# Patient Record
Sex: FEMALE | Race: WHITE | NOT HISPANIC OR LATINO | Employment: UNEMPLOYED | URBAN - METROPOLITAN AREA
[De-identification: names, ages, dates, MRNs, and addresses within clinical notes are randomized per-mention and may not be internally consistent; named-entity substitution may affect disease eponyms.]

---

## 2017-01-11 ENCOUNTER — TELEPHONE (OUTPATIENT)
Dept: GASTROENTEROLOGY | Facility: CLINIC | Age: 52
End: 2017-01-11
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
## 2017-06-02 ENCOUNTER — OFFICE VISIT (OUTPATIENT)
Dept: OBSTETRICS AND GYNECOLOGY | Facility: CLINIC | Age: 52
End: 2017-06-02
Payer: MEDICAID

## 2017-06-02 VITALS — SYSTOLIC BLOOD PRESSURE: 124 MMHG | DIASTOLIC BLOOD PRESSURE: 64 MMHG

## 2017-06-02 DIAGNOSIS — Z00.00 PREVENTATIVE HEALTH CARE: ICD-10-CM

## 2017-06-02 DIAGNOSIS — Z12.39 BREAST CANCER SCREENING: Primary | ICD-10-CM

## 2017-06-02 DIAGNOSIS — Z00.00 WELL WOMAN EXAM WITHOUT GYNECOLOGICAL EXAM: ICD-10-CM

## 2017-06-02 DIAGNOSIS — Z00.00 ANNUAL PHYSICAL EXAM: Primary | ICD-10-CM

## 2017-06-02 PROCEDURE — 88175 CYTOPATH C/V AUTO FLUID REDO: CPT

## 2017-06-02 PROCEDURE — 99213 OFFICE O/P EST LOW 20 MIN: CPT | Mod: PBBFAC | Performed by: NURSE PRACTITIONER

## 2017-06-02 PROCEDURE — 99999 PR PBB SHADOW E&M-EST. PATIENT-LVL III: CPT | Mod: PBBFAC,,, | Performed by: NURSE PRACTITIONER

## 2017-06-02 PROCEDURE — 99386 PREV VISIT NEW AGE 40-64: CPT | Mod: S$PBB,,, | Performed by: NURSE PRACTITIONER

## 2017-06-02 RX ORDER — PAROXETINE HYDROCHLORIDE 20 MG/1
20 TABLET, FILM COATED ORAL
COMMUNITY
Start: 2017-05-17 | End: 2017-06-02 | Stop reason: SDUPTHER

## 2017-06-02 NOTE — PROGRESS NOTES
CC: Well woman exam    Leticia Jimenez is a 51 y.o. female presents for well woman exam. Patient is s/p hysterectomy for endometriosis no ovary conservation. Last mammogram 3 years, normal. Patient wants to discuss HRT for night sweats.    Past Medical History:   Diagnosis Date    ADHD (attention deficit hyperactivity disorder)     Anxiety     Depression     Hypertension     Liver disease      Past Surgical History:   Procedure Laterality Date    HYSTERECTOMY      OOPHORECTOMY      TONSILLECTOMY       Social History     Social History    Marital status:      Spouse name: N/A    Number of children: N/A    Years of education: N/A     Occupational History    Not on file.     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     Family History   Problem Relation Age of Onset    Diabetes Maternal Grandmother     Heart disease Maternal Grandfather     Cancer Neg Hx     Melanoma Neg Hx     Psoriasis Neg Hx     Lupus Neg Hx     Eczema Neg Hx      OB History     No data available          There were no vitals taken for this visit.      ROS:  GENERAL: Denies weight gain or weight loss. Feeling well overall.   SKIN: Denies rash or lesions.   HEAD: Denies head injury or headache.   NODES: Denies enlarged lymph nodes.   CHEST: Denies chest pain or shortness of breath.   CARDIOVASCULAR: Denies palpitations or left sided chest pain.   ABDOMEN: No abdominal pain, constipation, diarrhea, nausea, vomiting or rectal bleeding.   URINARY: No frequency, dysuria, hematuria, or burning on urination.  REPRODUCTIVE: See HPI.   BREASTS: The patient performs breast  self-examination and denies pain, lumps, or nipple discharge.   HEMATOLOGIC: No easy bruisability or excessive bleeding.   MUSCULOSKELETAL: Denies joint pain or swelling.   NEUROLOGIC: Denies syncope or weakness.   PSYCHIATRIC: Denies depression, anxiety or mood swings.    PHYSICAL EXAM:  APPEARANCE: Well nourished, well developed, in no acute distress.  AFFECT: WNL, alert and oriented x 3  SKIN: No acne or hirsutism  NECK: Neck symmetric without masses or thyromegaly  NODES: No inguinal, cervical, axillary, or femoral lymph node enlargement  CHEST: Good respiratory effect  ABDOMEN: Soft.  No tenderness or masses.    BREASTS: Symmetrical, no skin changes or visible lesions.  No palpable masses, nipple discharge bilaterally.  PELVIC: Normal external genitalia without lesions.  Normal hair distribution.  Adequate perineal body, normal urethral meatus.  Vagina atrophy.  No significant cystocele or rectocele.      1. Breast cancer screening  Mammo Digital Screening Bilat with CAD   2. Well woman exam without gynecological exam  Mammo Digital Screening Bilat with CAD    Liquid-based pap smear, screening   3. Preventative health care  Mammo Digital Screening Bilat with CAD    Liquid-based pap smear, screening    PLAN:  Mammogram  Pap exam  Patient aware of the need to see MD for HRT due to past medical history.  Patient was counseled today on A.C.S. Pap guidelines and recommendations for yearly pelvic exams, mammograms and monthly self breast exams; to see her PCP for other health maintenance.

## 2017-06-13 ENCOUNTER — HOSPITAL ENCOUNTER (OUTPATIENT)
Dept: RADIOLOGY | Facility: HOSPITAL | Age: 52
Discharge: HOME | End: 2017-06-13
Attending: NURSE PRACTITIONER
Payer: MEDICAID

## 2017-06-13 VITALS — WEIGHT: 103 LBS | HEIGHT: 64 IN | BODY MASS INDEX: 17.58 KG/M2

## 2017-06-13 DIAGNOSIS — Z00.00 ANNUAL PHYSICAL EXAM: ICD-10-CM

## 2017-06-13 PROCEDURE — 77067 SCR MAMMO BI INCL CAD: CPT | Mod: TC

## 2017-06-13 PROCEDURE — 77067 SCR MAMMO BI INCL CAD: CPT | Mod: 26,,, | Performed by: RADIOLOGY

## 2017-08-28 ENCOUNTER — HOSPITAL ENCOUNTER (EMERGENCY)
Facility: HOSPITAL | Age: 52
Discharge: HOME | End: 2017-08-28
Payer: MEDICAID

## 2017-08-28 VITALS
OXYGEN SATURATION: 99 % | RESPIRATION RATE: 18 BRPM | WEIGHT: 128 LBS | SYSTOLIC BLOOD PRESSURE: 129 MMHG | HEART RATE: 88 BPM | BODY MASS INDEX: 22.68 KG/M2 | DIASTOLIC BLOOD PRESSURE: 72 MMHG | HEIGHT: 63 IN | TEMPERATURE: 99 F

## 2017-08-28 DIAGNOSIS — M25.561 PAIN AND SWELLING OF RIGHT KNEE: ICD-10-CM
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
## 2017-08-28 DIAGNOSIS — S49.92XA INJURY OF LEFT SHOULDER: ICD-10-CM

## 2017-08-28 DIAGNOSIS — S82.001A CLOSED NONDISPLACED FRACTURE OF RIGHT PATELLA, UNSPECIFIED FRACTURE MORPHOLOGY, INITIAL ENCOUNTER: Primary | ICD-10-CM

## 2017-08-28 DIAGNOSIS — M25.461 EFFUSION OF RIGHT KNEE JOINT: ICD-10-CM

## 2017-08-28 DIAGNOSIS — S40.012A CONTUSION OF LEFT SHOULDER, INITIAL ENCOUNTER: ICD-10-CM

## 2017-08-28 DIAGNOSIS — M25.461 PAIN AND SWELLING OF RIGHT KNEE: ICD-10-CM

## 2017-08-28 PROCEDURE — 99283 EMERGENCY DEPT VISIT LOW MDM: CPT | Mod: 25

## 2017-08-28 PROCEDURE — 29505 APPLICATION LONG LEG SPLINT: CPT | Mod: LT

## 2017-08-28 RX ORDER — HYDROCODONE BITARTRATE AND ACETAMINOPHEN 5; 325 MG/1; MG/1
1 TABLET ORAL EVERY 6 HOURS PRN
Qty: 7 TABLET | Refills: 0 | Status: SHIPPED | OUTPATIENT
Start: 2017-08-28 | End: 2017-09-08

## 2017-08-28 NOTE — ED PROVIDER NOTES
SCRIBE #1 NOTE: I, Smith Gleason, am scribing for, and in the presence of, HARIKA Mcdermott. I have scribed the entire note.      History      Chief Complaint   Patient presents with    Knee Pain     c/o Rt knee pain and Lt shoulder pain after fall       Review of patient's allergies indicates:   Allergen Reactions    Sulfa (sulfonamide antibiotics)         HPI   HPI    8/28/2017, 5:24 PM   History obtained from the patient      History of Present Illness: Leticia Jimenez is a 51 y.o. female patient who presents to the Emergency Department for right knee pain which onset suddenly 2 hours PTA after slipping and falling. Symptoms are constant and moderate in severity.  No mitigating or exacerbating factors reported. Associated sxs include left shoulder pain. Patient denies any LOC, fever, chills, n/v/d, numbness, weakness, back pain, neck pain, abdominal pain, CP, SOB, HA, and all other sxs at this time. No further complaints or concerns at this time.         Arrival mode: Personal vehicle      PCP: Petey Hernandez Iii, MD       Past Medical History:  Past Medical History:   Diagnosis Date    ADHD (attention deficit hyperactivity disorder)     Anxiety     Depression     GERD (gastroesophageal reflux disease)     Hypertension     Liver disease        Past Surgical History:  Past Surgical History:   Procedure Laterality Date    breast implants Bilateral 15 yrs ago    HYSTERECTOMY      OOPHORECTOMY      TONSILLECTOMY           Family History:  Family History   Problem Relation Age of Onset    Diabetes Maternal Grandmother     Heart disease Maternal Grandfather     Cancer Neg Hx     Melanoma Neg Hx     Psoriasis Neg Hx     Lupus Neg Hx     Eczema Neg Hx        Social History:  Social History     Social History Main Topics    Smoking status: Current Some Day Smoker     Packs/day: 0.25     Types: Cigarettes    Smokeless tobacco: Never Used    Alcohol use 0.0 oz/week      Comment: drinking today     Drug use: No    Sexual activity: Not Currently       ROS   Review of Systems   Constitutional: Negative for chills and fever.        - LOC   HENT: Negative for sore throat.    Respiratory: Negative for shortness of breath.    Cardiovascular: Negative for chest pain.   Gastrointestinal: Negative for abdominal pain, diarrhea, nausea and vomiting.   Genitourinary: Negative for dysuria.   Musculoskeletal: Positive for arthralgias (right knee, left shoulder). Negative for back pain and neck pain.   Skin: Negative for rash.   Neurological: Negative for dizziness, weakness, light-headedness, numbness and headaches.   Hematological: Does not bruise/bleed easily.       Physical Exam      Initial Vitals [08/28/17 1718]   BP Pulse Resp Temp SpO2   129/72 88 18 99 °F (37.2 °C) 99 %      MAP       91          Physical Exam  Nursing Notes and Vital Signs Reviewed.  Constitutional: Patient is in no acute distress. Well-developed and well-nourished. Smells of ETOH.  Head: Atraumatic. Normocephalic.  Eyes: PERRL. EOM intact. Conjunctivae are not pale. No scleral icterus.  Ears: Right TM normal. Left TM normal. No erythema. No bulging. No effusion or air-fluid levels. No perforation. Small abrasion with clotted blood to the left external ear. No hemotympanum. No TM rupture.  ENT: Moist mucus membranes.   Neck: Supple. Full ROM. No lymphadenopathy.  Cardiovascular: Regular rate. Regular rhythm. No murmurs, rubs, or gallops. Distal pulses are 2+ and symmetric.  Pulmonary/Chest: No respiratory distress. Clear to auscultation bilaterally. No wheezing, rales, or rhonchi.    Musculoskeletal: Moves all extremities. No obvious deformities. No edema.   LUE: has no evident deformity. negative for swelling. Negative for tenderness to palpation. Mild decreased ROM secondary to pain. Cap refill distally is <2 seconds. Radial pulses are equal and 2+ bilaterally. No motor deficit. No distal sensory deficit.  Right Knee:  No obvious deformity.  "There is moderate amount of  Swelling to the anterior knee.  There is diffuse tenderness to the anterior knee. Decreased ROM of the right knee.  No increased warmth, erythema, induration or fluctuance.  DP and PT pulses are 2+.  Normal capillary refill.  Distal sensation is intact.  Skin: Warm and dry.  Neurological:  Alert, awake, and appropriate.  Normal speech.  No acute focal neurological deficits are appreciated.  Psychiatric: Normal affect. Good eye contact. Appropriate in content.    ED Course    Orthopedic Injury  Date/Time: 8/28/2017 6:05 PM  Authorized by: DORY CARTER   Performed by: ALISSON HOLDEN  Injury location: knee  Location details: left knee  Injury type: fracture  Fracture type: patellar  Pre-procedure distal perfusion: normal  Pre-procedure neurological function: normal  Pre-procedure neurovascular assessment: neurovascularly intact  Pre-procedure range of motion: reduced  Immobilization: splint and crutches  Splint type: knee immobilizer.  Supplies used: Ortho-Glass  Post-procedure neurovascular assessment: post-procedure neurovascularly intact  Post-procedure distal perfusion: normal  Post-procedure neurological function: normal  Patient tolerance: Patient tolerated the procedure well with no immediate complications        ED Vital Signs:  Vitals:    08/28/17 1718   BP: 129/72   Pulse: 88   Resp: 18   Temp: 99 °F (37.2 °C)   TempSrc: Oral   SpO2: 99%   Weight: 58.1 kg (128 lb)   Height: 5' 3" (1.6 m)           Imaging Results:  Imaging Results          X-Ray Shoulder Trauma Left (Final result)  Result time 08/28/17 18:07:03    Final result by Tyson Daniels MD (08/28/17 18:07:03)                 Impression:         Negative.        Electronically signed by: TYSON DANIELS MD  Date:     08/28/17  Time:    18:07              Narrative:    Exam: XR SHOULDER TRAUMA 3 VIEW LEFT    Clinical History:    Acute left shoulder pain. Initial encounter.    Findings:      No osseous, articular, or " soft tissue abnormality demonstrated.                             X-Ray Knee 3 View Right (Final result)  Result time 08/28/17 18:06:37    Final result by Tyson Daniels MD (08/28/17 18:06:37)                 Impression:         Right patellar fracture.        Electronically signed by: TYSON DANIELS MD  Date:     08/28/17  Time:    18:06              Narrative:    Exam: XR KNEE 3 VIEW RIGHT    Clinical History:    Acute right knee pain. Initial encounter.    Findings:      Acute transverse, nondisplaced fracture through the inferior third of the patella.  Associated large joint effusion.  The joint spaces are preserved.  Alignment is satisfactory.                                      The Emergency Provider reviewed the vital signs and test results, which are outlined above.    ED Discussion     6:10 PM: Reassessed pt at this time.  Pt is awake, alert, and in no distress. Discussed with pt all pertinent ED information and results. Discussed pt dx and plan of tx. Gave pt all f/u and return to the ED instructions. All questions and concerns were addressed at this time. Pt expresses understanding of information and instructions, and is comfortable with plan to discharge. Pt is stable for discharge.        ED Medication(s):  Medications - No data to display    New Prescriptions    HYDROCODONE-ACETAMINOPHEN 5-325MG (NORCO) 5-325 MG PER TABLET    Take 1 tablet by mouth every 6 (six) hours as needed for Pain.       Follow-up Information     Romaine Green MD. Call in 2 days.    Specialty:  Orthopedic Surgery  Contact information:  [address] [place] [address] DR Lydia COLON [address] [phone]                     Medical Decision Making    Medical Decision Making:   Clinical Tests:   Radiological Study: Ordered and Reviewed           Scribe Attestation:   Scribe #1: I performed the above scribed service and the documentation accurately describes the services I performed. I attest to the accuracy of the note.    Attending:    Physician Attestation Statement for Scribe #1: I, HARIKA Mcdermott, personally performed the services described in this documentation, as scribed by Smith Gleason, in my presence, and it is both accurate and complete.          Clinical Impression       ICD-10-CM ICD-9-CM   1. Closed nondisplaced fracture of right patella, unspecified fracture morphology, initial encounter S82.001A 822.0   2. Fall W19.XXXA E888.9   3. Injury of left shoulder S49.92XA 959.2   4. Pain and swelling of right knee M25.561 719.46    M25.461 719.06   5. Effusion of right knee joint M25.461 719.06   6. Contusion of left shoulder, initial encounter S40.012A 923.00       Disposition:   Disposition: Discharged  Condition: Stable           Eliel Hills PA-C  08/28/17 1926

## 2017-08-31 ENCOUNTER — HOSPITAL ENCOUNTER (OUTPATIENT)
Dept: RADIOLOGY | Facility: HOSPITAL | Age: 52
Discharge: HOME | End: 2017-08-31
Attending: PHYSICIAN ASSISTANT
Payer: MEDICAID

## 2017-08-31 ENCOUNTER — OFFICE VISIT (OUTPATIENT)
Dept: ORTHOPEDICS | Facility: CLINIC | Age: 52
End: 2017-08-31
Payer: MEDICAID

## 2017-08-31 VITALS
SYSTOLIC BLOOD PRESSURE: 146 MMHG | BODY MASS INDEX: 22.68 KG/M2 | WEIGHT: 128 LBS | HEART RATE: 107 BPM | DIASTOLIC BLOOD PRESSURE: 100 MMHG | RESPIRATION RATE: 20 BRPM | HEIGHT: 63 IN

## 2017-08-31 DIAGNOSIS — G89.11 ACUTE PAIN OF LEFT SHOULDER DUE TO TRAUMA: ICD-10-CM

## 2017-08-31 DIAGNOSIS — S82.091A OTHER CLOSED FRACTURE OF RIGHT PATELLA, INITIAL ENCOUNTER: Primary | ICD-10-CM

## 2017-08-31 DIAGNOSIS — M25.512 ACUTE PAIN OF LEFT SHOULDER DUE TO TRAUMA: ICD-10-CM

## 2017-08-31 DIAGNOSIS — S82.091A OTHER CLOSED FRACTURE OF RIGHT PATELLA, INITIAL ENCOUNTER: ICD-10-CM

## 2017-08-31 DIAGNOSIS — S42.292A HUMERAL HEAD FRACTURE, LEFT, CLOSED, INITIAL ENCOUNTER: ICD-10-CM

## 2017-08-31 PROCEDURE — 99999 PR PBB SHADOW E&M-EST. PATIENT-LVL IV: CPT | Mod: PBBFAC,,, | Performed by: PHYSICIAN ASSISTANT

## 2017-08-31 PROCEDURE — 99204 OFFICE O/P NEW MOD 45 MIN: CPT | Mod: S$PBB,,, | Performed by: PHYSICIAN ASSISTANT

## 2017-08-31 PROCEDURE — 73560 X-RAY EXAM OF KNEE 1 OR 2: CPT | Mod: TC,PO,LT

## 2017-08-31 PROCEDURE — 3080F DIAST BP >= 90 MM HG: CPT | Mod: ,,, | Performed by: PHYSICIAN ASSISTANT

## 2017-08-31 PROCEDURE — 99214 OFFICE O/P EST MOD 30 MIN: CPT | Mod: PBBFAC,25,PO | Performed by: PHYSICIAN ASSISTANT

## 2017-08-31 PROCEDURE — 3008F BODY MASS INDEX DOCD: CPT | Mod: ,,, | Performed by: PHYSICIAN ASSISTANT

## 2017-08-31 PROCEDURE — 73030 X-RAY EXAM OF SHOULDER: CPT | Mod: 26,LT,, | Performed by: RADIOLOGY

## 2017-08-31 PROCEDURE — 73030 X-RAY EXAM OF SHOULDER: CPT | Mod: TC,PO,LT

## 2017-08-31 PROCEDURE — 73560 X-RAY EXAM OF KNEE 1 OR 2: CPT | Mod: 26,59,LT, | Performed by: RADIOLOGY

## 2017-08-31 PROCEDURE — 3077F SYST BP >= 140 MM HG: CPT | Mod: ,,, | Performed by: PHYSICIAN ASSISTANT

## 2017-08-31 PROCEDURE — 73562 X-RAY EXAM OF KNEE 3: CPT | Mod: 26,RT,, | Performed by: RADIOLOGY

## 2017-08-31 RX ORDER — TRAMADOL HYDROCHLORIDE 50 MG/1
50 TABLET ORAL EVERY 8 HOURS PRN
Qty: 40 TABLET | Refills: 0 | Status: SHIPPED | OUTPATIENT
Start: 2017-08-31 | End: 2017-09-08

## 2017-08-31 NOTE — PATIENT INSTRUCTIONS
Shoulder Fracture  You have a break (fracture) of the shoulder. This may be a small crack in the bone. Or it may be a major break with the broken parts pushed out of position.    If you have only a crack in the bone and no bone fragments are out of place, you will probably be treated with a shoulder immobilizer. This is a special type of sling. Casts are usually not used for this type of fracture. Your bone should heal in 4 to 8 weeks. More serious injuries may need surgery to put the bones back into the correct position for healing.  Home care  Follow these tips to care for yourself at home:  · Leave the shoulder immobilizer in place. This will support the injured arm at your side. This is the best position for the bone to heal.  · The shoulder immobilizer is adjustable. If it becomes loose, adjust it so that your forearm is level with the ground (horizontal). Your hand should be level with your elbow.  · Apply an ice pack to the injured area for 20 minutes every 1 to 2 hours the first day. You can make an ice pack by putting ice cubes in a plastic bag. A bag of frozen peas or something similar works well too. Wrap the bag in a towel before putting it on your shoulder. Continue with ice packs 3 to 4 times a day for the next 2 to 3 days. Then use the ice as needed to relieve pain and swelling.  · You may take acetaminophen or ibuprofen to relieve pain, unless another pain medicine was prescribed. If you have chronic liver or kidney disease or ever had a stomach ulcer or gastrointestinal bleeding, talk with your doctor before using these medicines.  · Dont take the sling off before your next exam unless you were told to do so. Ask if you should move your elbow, wrist, and hand.  Follow-up care  Follow up with your healthcare provider, or as advised.  A shoulder joint will become stiff if left in a sling for too long. Ask your doctor when it is safe to begin range-of-motion exercises.  When to seek medical  advice  Call your healthcare provider right away if any of these occur:  · Your fingers become swollen, cold, blue, numb, or tingly  · Your shoulder or upper arm swells a lot or looks very bruised  · The pain in your shoulder gets worse  · The splint or immobilizer breaks  · You have a fever or chills  Date Last Reviewed: 8/1/2016  © 4396-1354 [employer]. [address] [place] [address], [place] [address]. All rights reserved. This information is not intended as a substitute for professional medical care. Always follow your healthcare professional's instructions.        Patella Fracture  Your kneecap bone (patella) is broken (fractured). This causes pain, swelling, and sometimes bruising. Depending on how severe the fracture is, it may take about 4 to 6 weeks to heal. A knee brace (immobilizer), splint, or cast will be put on.    Home care  Follow these guidelines when caring for yourself at home:  · You will be given a splint, cast, or knee brace to keep your knee joint from moving. Unless you were told otherwise, use crutches or a walker. Dont put weight on the injured leg until your health care provider tells you to do so. You can rent crutches or a walker at many pharmacies and surgical or orthopedic supply stores.  · Keep your leg elevated to reduce pain and swelling. When sleeping, put a pillow under the injured leg. When sitting, support the injured leg so it is level with your waist. This is very important during the first 2 days (48 hours).  · Put an ice pack on the injured area. Do this for 20 minutes every 1 to 2 hours the first day for pain relief. You can make an ice pack by wrapping a plastic bag of ice cubes in a thin towel. As the ice melts, be careful that the cast/splint/brace doesnt get wet. You can place the ice pack directly over the splint/cast/brace. Continue using the ice pack 3 to 4 times a day for the next 2 days. Then use the ice pack as needed to ease pain and swelling.  · Keep  the cast/splint/brace completely dry at all times. Bathe with your cast/splint/brace out of the water. Protect it with a large plastic bag, rubber-banded at the top end. If a brace or fiberglass cast/splint/brace gets wet, you can dry it with a hair dryer.  · You may use acetaminophen or ibuprofen to control pain, unless another pain medicine was prescribed. If you have chronic liver or kidney disease, talk with your health care provider before using these medicines. Also talk with your provider if youve had a stomach ulcer or GI bleeding.  · Your health care provider may tell you to do strengthening exercises once you can put weight on the injured leg. The exercises will limit bone and muscle loss.  · You may need surgery if the bone is pulled too far apart or is displaced.  · Its very important to follow up with your health care provider. This is because a kneecap fracture can cause long-term problems like arthritis, chronic pain, and weakness.  Follow-up care  Follow up with your health care provider in 1 week, or as advised. This is to make sure the bone is healing the way it should.  If X-rays were taken, a radiologist will look at them. You will be told of any new findings that may affect your care.  When to seek medical advice  Call your health care provider right away if any of these occur:  · The plaster cast or splint becomes wet or soft  · The cast cracks  · The fiberglass cast or splint stays wet for more than 24 hours  · Knee pain or tightness under the brace, splint, or cast gets worse  · Toes become swollen, cold, blue, numb, or tingly  · You cant move your toes or your leg becomes weak  · Skin around cast becomes red  Date Last Reviewed: 2/16/2015  © 7873-1246 [employer]. [address] [place] [address], [place] [address]. All rights reserved. This information is not intended as a substitute for professional medical care. Always follow your healthcare professional's instructions.    PLEASE  CALL [facility] FOR FOLLOW UP.  WEAR THE KNEE SPLINT FOR 6 WEEKS.  AND THE SHOULDER SLING FOR 6 WEEKS.

## 2017-08-31 NOTE — LETTER
August 31, 2017      SHANAE Irvin MD  [address] [facility] [address]           [facility]  [address] [place] [id number]  Phone: [phone]  Fax: [phone]          Patient: Leticia Jimenez   MR Number: [id number]   YOB: 1965   Date of Visit: 8/31/2017       Dear Dr. SHANAE Irvin:    Thank you for referring Leticia Jimenez to me for evaluation. Attached you will find relevant portions of my assessment and plan of care.    If you have questions, please do not hesitate to call me. I look forward to following Leticia Jimenez along with you.    Sincerely,    Yefri Lezama PA-C    Enclosure  CC:  No Recipients    If you would like to receive this communication electronically, please contact [email or web address] or [phone] to request more information on [employer] Link access.    For providers and/or their staff who would like to refer a patient to [facility], please contact us through our one-stop-shop provider referral line, [employer] [facility], at [phone].    If you feel you have received this communication in error or would no longer like to receive these types of communications, please e-mail [email or web address]

## 2017-09-01 NOTE — PROGRESS NOTES
CC: 51-year-old female ER follow-up for right knee pain and left shoulder pain    Date of injury: August 28, 2017    HPI: Patient states she is in a carport when flips flops shoes.  She slipped landing on her outstretched left arm and her right knee.  She was seen in emergency room and comes in today.  She currently planes of pain as a 10 out of 10.  In her left shoulder and her right knee.  She denies a prior injury to the area.    PMH:    Past Medical History:   Diagnosis Date    ADHD (attention deficit hyperactivity disorder)     Anxiety     Depression     GERD (gastroesophageal reflux disease)     Hypertension     Liver disease        PSH:    Past Surgical History:   Procedure Laterality Date    breast implants Bilateral 15 yrs ago    HYSTERECTOMY      OOPHORECTOMY      TONSILLECTOMY         Family Hx:    Family History   Problem Relation Age of Onset    Diabetes Maternal Grandmother     Heart disease Maternal Grandfather     Cancer Neg Hx     Melanoma Neg Hx     Psoriasis Neg Hx     Lupus Neg Hx     Eczema Neg Hx        Allergy:    Review of patient's allergies indicates:   Allergen Reactions    Sulfa (sulfonamide antibiotics)        Medication:    Current Outpatient Prescriptions:     AMPHETAMINE SALT COMBO 30 MG tablet, , Disp: , Rfl: 0    hydrocodone-acetaminophen 5-325mg (NORCO) 5-325 mg per tablet, Take 1 tablet by mouth every 6 (six) hours as needed for Pain., Disp: 7 tablet, Rfl: 0    meloxicam (MOBIC) 7.5 MG tablet, Take 1 tablet (7.5 mg total) by mouth once daily., Disp: 20 tablet, Rfl: 0    pantoprazole (PROTONIX) 40 MG tablet, TAKE 1 TABLET BY MOUTH EVERY DAY, Disp: 30 tablet, Rfl: 0    paroxetine (PAXIL) 20 MG tablet, TAKE 1 TABLET BY MOUTH EVERY MORNING, Disp: 30 tablet, Rfl: 0    tramadol (ULTRAM) 50 mg tablet, Take 1 tablet (50 mg total) by mouth every 8 (eight) hours as needed for Pain., Disp: 40 tablet, Rfl: 0    Social History:    Social History     Social History     "Marital status:      Spouse name: N/A    Number of children: N/A    Years of education: N/A     Occupational History    Not on file.     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       Vitals:   BP (!) 146/100   Pulse 107   Resp 20   Ht 5' 3" (1.6 m)   Wt 58.1 kg (128 lb)   BMI 22.67 kg/m²      ROS:  GENERAL: No fever, chills, fatigability or weight loss.  SKIN: No rashes, itching or changes in color or texture of skin.  HEAD: No headaches or recent head trauma.  EYES: Visual acuity fine. No photophobia, ocular pain or diplopia.  EARS: Denies ear pain, discharge or vertigo.  NOSE: No loss of smell, no epistaxis or postnasal drip.  MOUTH & THROAT: No hoarseness or change in voice. No excessive gum bleeding.  NODES: Denies swollen glands.  CHEST: Denies DAILEY, cyanosis, wheezing, cough and sputum production.  CARDIOVASCULAR: Denies chest pain, PND, orthopnea or reduced exercise tolerance.  ABDOMEN: Appetite fine. No weight loss. Denies diarrhea, abdominal pain, hematemesis or blood in stool.  URINARY: No flank pain, dysuria or hematuria.  PERIPHERAL VASCULAR: No claudication or cyanosis.  NEUROLOGIC: No history of seizures, paralysis, alteration of gait or coordination.  MUSCULOSKELETAL: See HPI    PE:  medical assistant Umm was in the room during the entire exam.    APPEARANCE: Well nourished, well developed, in no acute distress.   HEAD: Normocephalic, atraumatic.  NEUROLOGIC: Cranial Nerves: II-XII grossly intact, also see MUSCULOSKELETAL  MUSCULOSKELETAL: Left shoulder-increased ecchymosis and bruising on the bicep and tricep.  Diffuse tenderness along the humeral head.  No " deformity is appreciated.  Neurovascular is intact.  Skin is intact no sign of infection.  Right knee-currently has a knee immobilizer in place.  After mobilizing removed.  She diffuse tennis along the patella there is negative effusion ecchymosis or edema noted of the knee.  Neurovascular she is intact, good capillary refill good pulses.    Assessment:           Diagnosis:              1.  Right patella fracture-good alignment               2.  Left humeral head fracture good alignment    Diagnostic Studies  MRI-No  X-Ray-Yes agree with findings of  Left Shoulder-There is an acute fracture involving the greater tuberosity with the fracture fragment demonstrating approximately 1.4 cm of distraction at the fracture site.  No dislocation.    Right knee- Acute transverse, nondisplaced fracture through the inferior third of the patella.  Associated large joint effusion.  The joint spaces are preserved.  Alignment is satisfactory.    Plan:                                                 1. PT-no                                                 2.OT-no                                          3.NSAID-no                                        4. Narcotics-yes   Tramadol,    she asked for more narcotics otherwise no be prescribed at that point she stated is okay she will pain medications from her mother.                              5. Wound care-N/A                                 6. Rest-yes                                           7. Surgery-no                                         8. ALESHIA Hose-no                                    9. Anticoagulation therapy-no               10. Elevation-no                                     11. Crutches-no                                    12. Walker-no             13. Cane no                        14. Referral-yes  [facility]                                 15.Injection-no                            16. Splint   /    Cast   /   Cast Shoe-Yes knee immobilizer to right knee,  shoulder sling to left shoulder             17. WALE            18. Follow up-  [facility]

## 2017-09-08 ENCOUNTER — OFFICE VISIT (OUTPATIENT)
Dept: OBSTETRICS AND GYNECOLOGY | Facility: CLINIC | Age: 52
End: 2017-09-08
Payer: MEDICAID

## 2017-09-08 ENCOUNTER — TELEPHONE (OUTPATIENT)
Dept: OBSTETRICS AND GYNECOLOGY | Facility: CLINIC | Age: 52
End: 2017-09-08

## 2017-09-08 VITALS
WEIGHT: 140.88 LBS | BODY MASS INDEX: 24.96 KG/M2 | HEIGHT: 63 IN | SYSTOLIC BLOOD PRESSURE: 140 MMHG | DIASTOLIC BLOOD PRESSURE: 90 MMHG

## 2017-09-08 DIAGNOSIS — R30.0 DYSURIA: Primary | ICD-10-CM

## 2017-09-08 PROCEDURE — 87086 URINE CULTURE/COLONY COUNT: CPT

## 2017-09-08 PROCEDURE — 99999 PR PBB SHADOW E&M-EST. PATIENT-LVL III: CPT | Mod: PBBFAC,,, | Performed by: NURSE PRACTITIONER

## 2017-09-08 PROCEDURE — 3008F BODY MASS INDEX DOCD: CPT | Mod: ,,, | Performed by: NURSE PRACTITIONER

## 2017-09-08 PROCEDURE — 99213 OFFICE O/P EST LOW 20 MIN: CPT | Mod: S$PBB,,, | Performed by: NURSE PRACTITIONER

## 2017-09-08 PROCEDURE — 99213 OFFICE O/P EST LOW 20 MIN: CPT | Mod: PBBFAC | Performed by: NURSE PRACTITIONER

## 2017-09-08 PROCEDURE — 3077F SYST BP >= 140 MM HG: CPT | Mod: ,,, | Performed by: NURSE PRACTITIONER

## 2017-09-08 PROCEDURE — 3080F DIAST BP >= 90 MM HG: CPT | Mod: ,,, | Performed by: NURSE PRACTITIONER

## 2017-09-08 RX ORDER — ATORVASTATIN CALCIUM 10 MG/1
10 TABLET, FILM COATED ORAL DAILY
COMMUNITY

## 2017-09-08 RX ORDER — PHENAZOPYRIDINE HYDROCHLORIDE 200 MG/1
200 TABLET, FILM COATED ORAL 3 TIMES DAILY PRN
Qty: 10 TABLET | Refills: 0 | Status: SHIPPED | OUTPATIENT
Start: 2017-09-08 | End: 2017-09-18

## 2017-09-08 RX ORDER — NITROFURANTOIN 25; 75 MG/1; MG/1
100 CAPSULE ORAL 2 TIMES DAILY
Qty: 14 CAPSULE | Refills: 0 | Status: SHIPPED | OUTPATIENT
Start: 2017-09-08 | End: 2017-09-15

## 2017-09-08 NOTE — PATIENT INSTRUCTIONS

## 2017-09-08 NOTE — PROGRESS NOTES
"CC: Dysuria    Leticia Jimenez is a 51 y.o. female  presents for dysuria for 4 days. No fever, flank pain or hematuria.    Past Medical History:   Diagnosis Date    ADHD (attention deficit hyperactivity disorder)     Anxiety     Depression     GERD (gastroesophageal reflux disease)     Hypertension     Liver disease      Past Surgical History:   Procedure Laterality Date    breast implants Bilateral 15 yrs ago    HYSTERECTOMY      OOPHORECTOMY      TONSILLECTOMY       Social History     Social History    Marital status:      Spouse name: N/A    Number of children: N/A    Years of education: N/A     Occupational History    Not on file.     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     Family History   Problem Relation Age of Onset    Diabetes Maternal Grandmother     Heart disease Maternal Grandfather     Cancer Neg Hx     Melanoma Neg Hx     Psoriasis Neg Hx     Lupus Neg Hx     Eczema Neg Hx      OB History      Para Term  AB Living    0 0 0 0 0 0    SAB TAB Ectopic Multiple Live Births    0 0 0 0 0          BP (!) 140/90   Ht 5' 3" (1.6 m)   Wt 63.9 kg (140 lb 14 oz)   BMI 24.95 kg/m²       ROS:  GENERAL: Denies weight gain or weight loss. Feeling well overall.   CHEST: Denies chest pain or shortness of breath.   CARDIOVASCULAR: Denies palpitations or left sided chest pain.   ABDOMEN: No abdominal pain, constipation, diarrhea, nausea, vomiting or rectal bleeding.   URINARY: HPI  REPRODUCTIVE: See HPI.     PHYSICAL EXAM:  APPEARANCE: Well nourished, well developed, in no acute distress.  AFFECT: WNL, alert and oriented x 3  ABDOMEN: " Soft.  No tenderness or masses.    1. Dysuria  Urine culture     PLAN:  Urine sent for cx  Macrobid and pyridium   Patient was counseled today on A.C.S. Pap guidelines and recommendations for yearly pelvic exams, mammograms and monthly self breast exams; to see her PCP for other health maintenance.

## 2017-09-08 NOTE — TELEPHONE ENCOUNTER
----- Message from Evelyn Puckett sent at 9/8/2017  9:43 AM CDT -----  Contact: pt   Pt states that she need to be seen today. Pt stays that she has a UTI with blood and its painful.  ..[phone] (home)

## 2017-09-10 LAB — BACTERIA UR CULT: NO GROWTH

## 2017-09-27 ENCOUNTER — TELEPHONE (OUTPATIENT)
Dept: ORTHOPEDICS | Facility: CLINIC | Age: 52
End: 2017-09-27

## 2017-09-27 NOTE — TELEPHONE ENCOUNTER
Patient came in today for a visit with Mio Coleman PA-C but was previously treated by Yefri Lezama PA-C. Patient was advised that in order to keep the continuity of care since she was previously treated by SHANAE Lezama PA-C that she would have to either follow up with him or [facility] per his referral. Patient asked why was she referred to [facility] which I informed her that I was not sure of why she was referred to [facility] and this would have been a part of her conversation with him during her office visit. Patient was offered the next available appointment date with Yefri Lezama PA-C on 10/26/2017 which she declined stating that she could not wait that long because her shoulder was not getting any better. Patient left stating that she would just go to the emergency room.

## 2017-09-28 ENCOUNTER — TELEPHONE (OUTPATIENT)
Dept: ORTHOPEDICS | Facility: CLINIC | Age: 52
End: 2017-09-28

## 2017-09-28 NOTE — TELEPHONE ENCOUNTER
Patient called to confirm 11 am appt with Yefri Lezama PA-C. I advised no appointment on record. While on the call [facility] called the patient to confirm her appointment at 11 am with them today. Patient following up with [facility] per Yefri's advice. //MARLENE EID

## 2018-03-19 ENCOUNTER — HOSPITAL ENCOUNTER (EMERGENCY)
Facility: HOSPITAL | Age: 53
Discharge: HOME | End: 2018-03-19
Attending: EMERGENCY MEDICINE
Payer: MEDICAID

## 2018-03-19 VITALS
HEART RATE: 108 BPM | SYSTOLIC BLOOD PRESSURE: 156 MMHG | HEIGHT: 63 IN | OXYGEN SATURATION: 95 % | WEIGHT: 132.88 LBS | RESPIRATION RATE: 20 BRPM | BODY MASS INDEX: 23.54 KG/M2 | DIASTOLIC BLOOD PRESSURE: 98 MMHG | TEMPERATURE: 99 F

## 2018-03-19 DIAGNOSIS — N30.01 ACUTE CYSTITIS WITH HEMATURIA: Primary | ICD-10-CM

## 2018-03-19 LAB
AMORPH CRY URNS QL MICRO: ABNORMAL
BACTERIA #/AREA URNS HPF: ABNORMAL /HPF
BILIRUB UR QL STRIP: NEGATIVE
CLARITY UR: ABNORMAL
COLOR UR: YELLOW
GLUCOSE UR QL STRIP: NEGATIVE
HGB UR QL STRIP: ABNORMAL
HYALINE CASTS #/AREA URNS LPF: 0 /LPF
KETONES UR QL STRIP: ABNORMAL
LEUKOCYTE ESTERASE UR QL STRIP: ABNORMAL
MICROSCOPIC COMMENT: ABNORMAL
NITRITE UR QL STRIP: NEGATIVE
NON-SQ EPI CELLS #/AREA URNS HPF: 1 /HPF
PH UR STRIP: 6 [PH] (ref 5–8)
PROT UR QL STRIP: ABNORMAL
RBC #/AREA URNS HPF: >100 /HPF (ref 0–4)
SP GR UR STRIP: 1.01 (ref 1–1.03)
SQUAMOUS #/AREA URNS HPF: 2 /HPF
URN SPEC COLLECT METH UR: ABNORMAL
UROBILINOGEN UR STRIP-ACNC: NEGATIVE EU/DL
WBC #/AREA URNS HPF: >100 /HPF (ref 0–5)
WBC CLUMPS URNS QL MICRO: ABNORMAL

## 2018-03-19 PROCEDURE — 81000 URINALYSIS NONAUTO W/SCOPE: CPT

## 2018-03-19 PROCEDURE — 99283 EMERGENCY DEPT VISIT LOW MDM: CPT

## 2018-03-19 RX ORDER — PHENAZOPYRIDINE HYDROCHLORIDE 200 MG/1
200 TABLET, FILM COATED ORAL 3 TIMES DAILY
Qty: 6 TABLET | Refills: 0 | Status: SHIPPED | OUTPATIENT
Start: 2018-03-19 | End: 2018-03-29

## 2018-03-19 RX ORDER — CEFUROXIME AXETIL 500 MG/1
500 TABLET ORAL 2 TIMES DAILY
Qty: 14 TABLET | Refills: 0 | Status: SHIPPED | OUTPATIENT
Start: 2018-03-19 | End: 2018-03-26

## 2018-03-19 NOTE — ED PROVIDER NOTES
SCRIBE #1 NOTE: I, Smith Gleason, am scribing for, and in the presence of, Obdulia Gutiérrez MD. I have scribed the entire note.      History      Chief Complaint   Patient presents with    Dysuria     patient states difficulty urinating and burning upon urination       Review of patient's allergies indicates:   Allergen Reactions    Sulfa (sulfonamide antibiotics)         HPI   HPI    3/19/2018, 5:38 AM   History obtained from the patient      History of Present Illness: Leticia Jimenez is a 52 y.o. female patient who presents to the Emergency Department for dysuria which onset gradually 1 day ago. Symptoms are constant and moderate in severity. No mitigating or exacerbating factors reported. Associated sxs include difficulty urinating and increased urinary frequency. Patient denies any fever, chills, n/v/d, abdominal pain, hematuria, vaginal bleeding, vaginal discharge, and all other sxs at this time. No further complaints or concerns at this time.         Arrival mode: Personal vehicle     PCP: Petey Hernandez Iii, MD       Past Medical History:  Past Medical History:   Diagnosis Date    ADHD (attention deficit hyperactivity disorder)     Anxiety     Depression     GERD (gastroesophageal reflux disease)     Hypertension     Liver disease        Past Surgical History:  Past Surgical History:   Procedure Laterality Date    breast implants Bilateral 15 yrs ago    HYSTERECTOMY      OOPHORECTOMY      TONSILLECTOMY           Family History:  Family History   Problem Relation Age of Onset    Diabetes Maternal Grandmother     Heart disease Maternal Grandfather     Cancer Neg Hx     Melanoma Neg Hx     Psoriasis Neg Hx     Lupus Neg Hx     Eczema Neg Hx        Social History:  Social History     Social History Main Topics    Smoking status: Current Some Day Smoker     Packs/day: 0.25     Types: Cigarettes    Smokeless tobacco: Never Used    Alcohol use 0.0 oz/week      Comment: drinking today    Drug use:  No    Sexual activity: Yes     Partners: Male       ROS   Review of Systems   Constitutional: Negative for chills and fever.   HENT: Negative for sore throat.    Respiratory: Negative for shortness of breath.    Cardiovascular: Negative for chest pain.   Gastrointestinal: Negative for abdominal pain, blood in stool, constipation, diarrhea, nausea and vomiting.   Genitourinary: Positive for difficulty urinating, dysuria and frequency (increase). Negative for hematuria, vaginal bleeding, vaginal discharge and vaginal pain.   Musculoskeletal: Negative for back pain.   Skin: Negative for rash.   Neurological: Negative for dizziness, weakness, light-headedness, numbness and headaches.   Hematological: Does not bruise/bleed easily.       Physical Exam      Initial Vitals [03/19/18 0454]   BP Pulse Resp Temp SpO2   (!) 156/98 108 20 99.2 °F (37.3 °C) 95 %      MAP       117.33          Physical Exam  Nursing Notes and Vital Signs Reviewed.  Constitutional: Patient is in no acute distress. Well-developed and well-nourished.  Head: Atraumatic. Normocephalic.  Eyes: PERRL. EOM intact. Conjunctivae are not pale. No scleral icterus.  ENT: Mucous membranes are moist. Oropharynx is clear and symmetric.    Neck: Supple. Full ROM. No lymphadenopathy.  Cardiovascular: Regular rate. Regular rhythm. No murmurs, rubs, or gallops. Distal pulses are 2+ and symmetric.  Pulmonary/Chest: No respiratory distress. Clear to auscultation bilaterally. No wheezing or rales.  Abdominal: Soft and non-distended.  There is no tenderness.  No rebound, guarding, or rigidity.  Genitourinary: No CVA tenderness  Musculoskeletal: Moves all extremities. No obvious deformities. No edema.  Skin: Warm and dry.  Neurological:  Alert, awake, and appropriate.  Normal speech.  No acute focal neurological deficits are appreciated.  Psychiatric: Normal affect. Good eye contact. Appropriate in content.    ED Course    Procedures  ED Vital Signs:  Vitals:    03/19/18  "0454   BP: (!) 156/98   Pulse: 108   Resp: 20   Temp: 99.2 °F (37.3 °C)   TempSrc: Oral   SpO2: 95%   Weight: 60.3 kg (132 lb 14.3 oz)   Height: 5' 3" (1.6 m)       Abnormal Lab Results:  Labs Reviewed   URINALYSIS - Abnormal; Notable for the following:        Result Value    Appearance, UA Hazy (*)     Protein, UA 2+ (*)     Ketones, UA Trace (*)     Occult Blood UA 3+ (*)     Leukocytes, UA 2+ (*)     All other components within normal limits   URINALYSIS MICROSCOPIC - Abnormal; Notable for the following:     RBC, UA >100 (*)     WBC, UA >100 (*)     WBC Clumps, UA Moderate (*)     Non-Squam Epith 1 (*)     All other components within normal limits        All Lab Results:  Results for orders placed or performed during the hospital encounter of 03/19/18   Urinalysis   Result Value Ref Range    Specimen UA Urine, Clean Catch     Color, UA Yellow Yellow, Straw, Margo    Appearance, UA Hazy (A) Clear    pH, UA 6.0 5.0 - 8.0    Specific Gravity, UA 1.015 1.005 - 1.030    Protein, UA 2+ (A) Negative    Glucose, UA Negative Negative    Ketones, UA Trace (A) Negative    Bilirubin (UA) Negative Negative    Occult Blood UA 3+ (A) Negative    Nitrite, UA Negative Negative    Urobilinogen, UA Negative <2.0 EU/dL    Leukocytes, UA 2+ (A) Negative   Urinalysis Microscopic   Result Value Ref Range    RBC, UA >100 (H) 0 - 4 /hpf    WBC, UA >100 (H) 0 - 5 /hpf    WBC Clumps, UA Moderate (A) None-Rare    Bacteria, UA Occasional None-Occ /hpf    Squam Epithel, UA 2 /hpf    Non-Squam Epith 1 (A) <1/hpf /hpf    Hyaline Casts, UA 0 0-1/lpf /lpf    Amorphous, UA Occasional None-Moderate    Microscopic Comment SEE COMMENT                   The Emergency Provider reviewed the vital signs and test results, which are outlined above.    ED Discussion     5:54 AM: Reassessed pt at this time.  Pt is awake, alert, and in no distress. Discussed with pt all pertinent ED information and results. Discussed pt dx and plan of tx. Gave pt all f/u and " return to the ED instructions. All questions and concerns were addressed at this time. Pt expresses understanding of information and instructions, and is comfortable with plan to discharge. Pt is stable for discharge.        ED Medication(s):  Medications - No data to display    Discharge Medication List as of 3/19/2018  5:55 AM      START taking these medications    Details   cefUROXime (CEFTIN) 500 MG tablet Take 1 tablet (500 mg total) by mouth 2 (two) times daily., Starting Mon 3/19/2018, Until Mon 3/26/2018, Print      phenazopyridine (PYRIDIUM) 200 MG tablet Take 1 tablet (200 mg total) by mouth 3 (three) times daily., Starting Mon 3/19/2018, Until Thu 3/29/2018, Print             Follow-up Information     Petey Hernandez Iii, MD In 2 days.    Specialty:  Internal Medicine  Contact information:  [address].  [address]  [place] [facility] [address]  [phone]             [facility].    Specialty:  Emergency Medicine  Why:  As needed, If symptoms worsen  Contact information:  [address]  [place] [address] [phone]                   Medical Decision Making    Medical Decision Making:   Clinical Tests:   Lab Tests: Ordered and Reviewed           Scribe Attestation:   Scribe #1: I performed the above scribed service and the documentation accurately describes the services I performed. I attest to the accuracy of the note.    Attending:   Physician Attestation Statement for Scribe #1: I, Obdulia Gutiérrez MD, personally performed the services described in this documentation, as scribed by Smith Gleason, in my presence, and it is both accurate and complete.          Clinical Impression       ICD-10-CM ICD-9-CM   1. Acute cystitis with hematuria N30.01 595.0       Disposition:   Disposition: Discharged  Condition: Stable         Obdulia Gutiérrez MD  03/21/18 0549

## 2018-07-26 ENCOUNTER — HOSPITAL ENCOUNTER (EMERGENCY)
Facility: HOSPITAL | Age: 53
Discharge: HOME | End: 2018-07-26
Attending: EMERGENCY MEDICINE
Payer: MEDICAID

## 2018-07-26 VITALS
SYSTOLIC BLOOD PRESSURE: 146 MMHG | RESPIRATION RATE: 18 BRPM | HEART RATE: 93 BPM | BODY MASS INDEX: 22.15 KG/M2 | TEMPERATURE: 98 F | DIASTOLIC BLOOD PRESSURE: 94 MMHG | HEIGHT: 63 IN | OXYGEN SATURATION: 98 % | WEIGHT: 125 LBS

## 2018-07-26 DIAGNOSIS — T78.40XA ALLERGIC REACTION, INITIAL ENCOUNTER: Primary | ICD-10-CM

## 2018-07-26 DIAGNOSIS — Z72.0 TOBACCO ABUSE: ICD-10-CM

## 2018-07-26 DIAGNOSIS — H02.849 EDEMA OF EYELID, UNSPECIFIED LATERALITY: ICD-10-CM

## 2018-07-26 PROCEDURE — 96372 THER/PROPH/DIAG INJ SC/IM: CPT

## 2018-07-26 PROCEDURE — 99283 EMERGENCY DEPT VISIT LOW MDM: CPT | Mod: 25

## 2018-07-26 PROCEDURE — 63600175 PHARM REV CODE 636 W HCPCS: Performed by: EMERGENCY MEDICINE

## 2018-07-26 RX ORDER — DEXAMETHASONE SODIUM PHOSPHATE 4 MG/ML
8 INJECTION, SOLUTION INTRA-ARTICULAR; INTRALESIONAL; INTRAMUSCULAR; INTRAVENOUS; SOFT TISSUE
Status: COMPLETED | OUTPATIENT
Start: 2018-07-26 | End: 2018-07-26

## 2018-07-26 RX ORDER — METHYLPREDNISOLONE 4 MG/1
TABLET ORAL
Qty: 1 PACKAGE | Refills: 0 | Status: SHIPPED | OUTPATIENT
Start: 2018-07-26 | End: 2018-08-16

## 2018-07-26 RX ORDER — METHYLPREDNISOLONE 4 MG/1
TABLET ORAL
Qty: 1 PACKAGE | Refills: 0 | Status: SHIPPED | OUTPATIENT
Start: 2018-07-26 | End: 2018-07-26

## 2018-07-26 RX ADMIN — DEXAMETHASONE SODIUM PHOSPHATE 8 MG: 4 INJECTION, SOLUTION INTRAMUSCULAR; INTRAVENOUS at 04:07

## 2018-07-26 NOTE — ED PROVIDER NOTES
SCRIBE #1 NOTE: I, Pauladuy Pendleton, am scribing for, and in the presence of, Jasper Simons MD. I have scribed the entire note.      History      Chief Complaint   Patient presents with    Eye Problem     swelling to bilat orbital area. Denies SOB/oral swelling.        Review of patient's allergies indicates:   Allergen Reactions    Sulfa (sulfonamide antibiotics)         HPI   HPI    7/26/2018, 4:17 AM   History obtained from the patient      History of Present Illness: Leticia Jimenez is a 52 y.o. female patient who presents to the Emergency Department for bilat upper and lower eyelid swelling which onset gradually today. Patient reports mowing the lawn yesterday. Patient denies use of any new soaps, detergents, foods, and medications. Symptoms are constant and moderate in severity. No mitigating or exacerbating factors reported. Associated sxs include eye irritation. Patient denies any fever, chills, eye pain, eye drainage, eye redness, visual disturbance, tongue swelling, difficulty swallowing, SOB, N/V, and all other sxs at this time. No further complaints or concerns at this time.     Arrival mode: Personal vehicle     PCP: Petey Hernandez Iii, MD       Past Medical History:  Past Medical History:   Diagnosis Date    ADHD (attention deficit hyperactivity disorder)     Anxiety     Depression     GERD (gastroesophageal reflux disease)     Hypertension     Liver disease        Past Surgical History:  Past Surgical History:   Procedure Laterality Date    breast implants Bilateral 15 yrs ago    HYSTERECTOMY      OOPHORECTOMY      TONSILLECTOMY           Family History:  Family History   Problem Relation Age of Onset    Diabetes Maternal Grandmother     Heart disease Maternal Grandfather     Cancer Neg Hx     Melanoma Neg Hx     Psoriasis Neg Hx     Lupus Neg Hx     Eczema Neg Hx        Social History:  Social History     Social History Main Topics    Smoking status: Current Some Day Smoker      Packs/day: 0.25     Types: Cigarettes    Smokeless tobacco: Never Used    Alcohol use 0.0 oz/week      Comment: drinking today    Drug use: No    Sexual activity: Yes     Partners: Male       ROS   Review of Systems   Constitutional: Negative for chills and fever.   HENT: Negative for sore throat and trouble swallowing.         (+) tongue swelling   Eyes: Positive for itching (bilat). Negative for pain, discharge, redness and visual disturbance.        (+) bilat upper and lower eyelid swelling   Respiratory: Negative for shortness of breath.    Cardiovascular: Negative for chest pain.   Gastrointestinal: Negative for nausea and vomiting.   Genitourinary: Negative for dysuria.   Musculoskeletal: Negative for back pain.   Skin: Negative for rash.   Neurological: Negative for weakness.   Hematological: Does not bruise/bleed easily.   All other systems reviewed and are negative.      Physical Exam      Initial Vitals [07/26/18 0403]   BP Pulse Resp Temp SpO2   (!) 146/94 93 18 98.1 °F (36.7 °C) 98 %      MAP       --          Physical Exam  Nursing Notes and Vital Signs Reviewed.  Constitutional: Patient is in no acute distress. Well-developed and well-nourished.  Head: Atraumatic. Normocephalic.  Eyes: PERRL. EOM intact. Conjunctivae are not pale. No scleral icterus. Bilat upper and lower lid edema, nontender.  ENT: Mucous membranes are moist. Oropharynx is clear and symmetric.    Neck: Supple. Full ROM. No lymphadenopathy.  Cardiovascular: Regular rate. Regular rhythm. No murmurs, rubs, or gallops. Distal pulses are 2+ and symmetric.  Pulmonary/Chest: No respiratory distress. Clear to auscultation bilaterally. No wheezing or rales.  Abdominal: Soft and non-distended.  There is no tenderness.  No rebound, guarding, or rigidity. Good bowel sounds.  Genitourinary: No CVA tenderness  Musculoskeletal: Moves all extremities. No obvious deformities. No edema. No calf tenderness.  Skin: Warm and dry.  Neurological:   "Alert, awake, and appropriate.  Normal speech.  No acute focal neurological deficits are appreciated.  Psychiatric: Normal affect. Good eye contact. Appropriate in content.    ED Course    Procedures  ED Vital Signs:  Vitals:    07/26/18 0403   BP: (!) 146/94   Pulse: 93   Resp: 18   Temp: 98.1 °F (36.7 °C)   TempSrc: Oral   SpO2: 98%   Weight: 56.7 kg (125 lb)   Height: 5' 3" (1.6 m)            The Emergency Provider reviewed the vital signs and test results, which are outlined above.    ED Discussion     4:25 AM: Reassessed pt at this time. She demonstrates no signs of airway compromise or anaphylaxis.  Discussed with pt all pertinent ED information and results. Discussed pt dx and plan of tx. Gave pt all f/u and return to the ED instructions. All questions and concerns were addressed at this time. Pt expresses understanding of information and instructions, and is comfortable with plan to discharge. Pt is stable for discharge.  Discussed with patient to f/u with PCP for re-evaluation and for referral to allergist.    I discussed with patient and/or family/caretaker that evaluation in the ED does not suggest any emergent or life threatening medical conditions requiring immediate intervention beyond what was provided in the ED, and I believe patient is safe for discharge.  Regardless, an unremarkable evaluation in the ED does not preclude the development or presence of a serious of life threatening condition. As such, patient was instructed to return immediately for any worsening or change in current symptoms.      ED Medication(s):  Medications   dexamethasone injection 8 mg (not administered)       New Prescriptions    METHYLPREDNISOLONE (MEDROL DOSEPACK) 4 MG TABLET    1 pack as directed       Follow-up Information     Petey Hernandez Iii, MD. Schedule an appointment as soon as possible for a visit in 1 day.    Specialty:  Internal Medicine  Why:  for reassessment  Contact information:  [address]  [address]  [facility] [address]  [phone]                     Medical Decision Making        Additional MDM:   Smoking Cessation: The patient is a smoker. The patient was counseled on smoking cessation for: 3 minutes. The patient was counseled on tobacco related  health complications. Appropriate patient literature was given to the patient concerning tobacco cessation.        Scribe Attestation:   Scribe #1: I performed the above scribed service and the documentation accurately describes the services I performed. I attest to the accuracy of the note.    Attending:   Physician Attestation Statement for Scribe #1: I, Jasper Simons MD, personally performed the services described in this documentation, as scribed by Paula Pendleton, in my presence, and it is both accurate and complete.          Clinical Impression       ICD-10-CM ICD-9-CM   1. Allergic reaction, initial encounter T78.40XA 995.3   2. Edema of eyelid, unspecified laterality H02.849 374.82   3. Tobacco abuse Z72.0 305.1       Disposition:   Disposition: Discharged  Condition: Stable         Jasper Simons MD  07/27/18 223

## 2018-07-26 NOTE — ED NOTES
The pt has bilateral periorbital edema. Pt states she cut her grass yesterday and woke up with eye swelling.  No adverse reaction noted at this time from IM injectioin.

## 2019-01-09 ENCOUNTER — OFFICE VISIT (OUTPATIENT)
Dept: OBSTETRICS AND GYNECOLOGY | Facility: CLINIC | Age: 54
End: 2019-01-09
Payer: MEDICAID

## 2019-01-09 ENCOUNTER — TELEPHONE (OUTPATIENT)
Dept: OBSTETRICS AND GYNECOLOGY | Facility: CLINIC | Age: 54
End: 2019-01-09

## 2019-01-09 VITALS
HEIGHT: 63 IN | WEIGHT: 133.81 LBS | DIASTOLIC BLOOD PRESSURE: 98 MMHG | BODY MASS INDEX: 23.71 KG/M2 | SYSTOLIC BLOOD PRESSURE: 136 MMHG

## 2019-01-09 DIAGNOSIS — R31.0 GROSS HEMATURIA: Primary | ICD-10-CM

## 2019-01-09 DIAGNOSIS — Z00.00 PREVENTATIVE HEALTH CARE: ICD-10-CM

## 2019-01-09 DIAGNOSIS — Z01.419 GYNECOLOGIC EXAM NORMAL: ICD-10-CM

## 2019-01-09 LAB
BACTERIA #/AREA URNS AUTO: ABNORMAL /HPF
BILIRUB UR QL STRIP: NEGATIVE
CLARITY UR REFRACT.AUTO: ABNORMAL
COLOR UR AUTO: ABNORMAL
GLUCOSE UR QL STRIP: ABNORMAL
HGB UR QL STRIP: ABNORMAL
HYALINE CASTS UR QL AUTO: 0 /LPF
KETONES UR QL STRIP: NEGATIVE
LEUKOCYTE ESTERASE UR QL STRIP: ABNORMAL
MICROSCOPIC COMMENT: ABNORMAL
NITRITE UR QL STRIP: NEGATIVE
PH UR STRIP: 5 [PH] (ref 5–8)
PROT UR QL STRIP: ABNORMAL
RBC #/AREA URNS AUTO: >100 /HPF (ref 0–4)
SP GR UR STRIP: 1.02 (ref 1–1.03)
URN SPEC COLLECT METH UR: ABNORMAL
WBC #/AREA URNS AUTO: 15 /HPF (ref 0–5)

## 2019-01-09 PROCEDURE — 99999 PR PBB SHADOW E&M-EST. PATIENT-LVL III: ICD-10-PCS | Mod: PBBFAC,,, | Performed by: NURSE PRACTITIONER

## 2019-01-09 PROCEDURE — 99999 PR PBB SHADOW E&M-EST. PATIENT-LVL III: CPT | Mod: PBBFAC,,, | Performed by: NURSE PRACTITIONER

## 2019-01-09 PROCEDURE — 99396 PR PREVENTIVE VISIT,EST,40-64: ICD-10-PCS | Mod: S$PBB,,, | Performed by: NURSE PRACTITIONER

## 2019-01-09 PROCEDURE — 99213 OFFICE O/P EST LOW 20 MIN: CPT | Mod: PBBFAC | Performed by: NURSE PRACTITIONER

## 2019-01-09 PROCEDURE — 99396 PREV VISIT EST AGE 40-64: CPT | Mod: S$PBB,,, | Performed by: NURSE PRACTITIONER

## 2019-01-09 PROCEDURE — 87086 URINE CULTURE/COLONY COUNT: CPT

## 2019-01-09 PROCEDURE — 81001 URINALYSIS AUTO W/SCOPE: CPT

## 2019-01-09 RX ORDER — ATORVASTATIN CALCIUM 10 MG/1
10 TABLET, FILM COATED ORAL
COMMUNITY
End: 2019-01-09 | Stop reason: SDUPTHER

## 2019-01-09 RX ORDER — LISINOPRIL AND HYDROCHLOROTHIAZIDE 12.5; 2 MG/1; MG/1
TABLET ORAL
COMMUNITY
End: 2021-03-17

## 2019-01-09 RX ORDER — PAROXETINE HYDROCHLORIDE 20 MG/1
TABLET, FILM COATED ORAL
COMMUNITY
Start: 2017-11-15

## 2019-01-09 RX ORDER — NAPROXEN SODIUM 220 MG/1
81 TABLET, FILM COATED ORAL DAILY
COMMUNITY

## 2019-01-09 RX ORDER — NITROFURANTOIN 25; 75 MG/1; MG/1
100 CAPSULE ORAL 2 TIMES DAILY
Qty: 14 CAPSULE | Refills: 0 | Status: SHIPPED | OUTPATIENT
Start: 2019-01-09 | End: 2019-01-16

## 2019-01-09 RX ORDER — PANTOPRAZOLE SODIUM 40 MG/1
TABLET, DELAYED RELEASE ORAL
COMMUNITY
Start: 2018-01-23 | End: 2019-01-09 | Stop reason: SDUPTHER

## 2019-01-09 NOTE — PROGRESS NOTES
"CC: Well woman exam    Leticia Jimenez is a 53 y.o. female  presents for a well woman exam.  Patient is s/p benign hysterectomy (  Dr.K. Otero) without ovary conservation. Patient reports that " she has blood in her urine and pelvic pain  for 12 hours". Urine in clinic indicated gross hematuria. Patient smokes 1-2 PPD. Last mammogram was normal. Is sexualy active.     Past Medical History:   Diagnosis Date    ADHD (attention deficit hyperactivity disorder)     Anxiety     Depression     GERD (gastroesophageal reflux disease)     Hypertension     Liver disease      Past Surgical History:   Procedure Laterality Date    breast implants Bilateral 15 yrs ago    ESOPHAGOGASTRODUODENOSCOPY (EGD) N/A 3/3/2015    Performed by Denny Sommers III, MD at [facility]    HYSTERECTOMY      OOPHORECTOMY      TONSILLECTOMY       Family History   Problem Relation Age of Onset    Diabetes Maternal Grandmother     Heart disease Maternal Grandfather     Cancer Neg Hx     Melanoma Neg Hx     Psoriasis Neg Hx     Lupus Neg Hx     Eczema Neg Hx      Social History     Tobacco Use    Smoking status: Current Every Day Smoker     Packs/day: 0.25     Types: Cigarettes    Smokeless tobacco: Current User   Substance Use Topics    Alcohol use: No     Alcohol/week: 0.0 oz     Frequency: Never    Drug use: No     OB History      Para Term  AB Living    0 0 0 0 0 0    SAB TAB Ectopic Multiple Live Births    0 0 0 0 0          BP (!) 136/98 (BP Location: Left arm, Patient Position: Sitting, BP Method: Medium (Manual))   Ht 5' 3" (1.6 m)   Wt 60.7 kg (133 lb 13.1 oz)   BMI 23.71 kg/m²     ROS:  GENERAL: Denies weight gain or weight loss. Feeling well overall.   SKIN: Denies rash or lesions.   HEAD: Denies head injury or headache.   NODES: Denies enlarged lymph nodes.   CHEST: Denies chest pain or shortness of breath.   CARDIOVASCULAR: Denies palpitations or left sided chest pain.   ABDOMEN: No abdominal " pain, constipation, diarrhea, nausea, vomiting or rectal bleeding.   URINARY: HPI  REPRODUCTIVE: See HPI.   BREASTS: The patient performs breast self-examination and denies pain, lumps, or nipple discharge.   HEMATOLOGIC: No easy bruisability or excessive bleeding.   MUSCULOSKELETAL: Denies joint pain or swelling.   NEUROLOGIC: Denies syncope or weakness.   PSYCHIATRIC: Denies depression, anxiety or mood swings.    PE:   APPEARANCE: Well nourished, well developed, in no acute distress.  AFFECT: WNL, alert and oriented x 3.  SKIN: No acne or hirsutism.  NECK: Neck symmetric without masses or thyromegaly.  NODES: No inguinal, cervical, axillary or femoral lymph node enlargement.  CHEST: Good respiratory effort.   ABDOMEN: Soft. No tenderness or masses. No hepatosplenomegaly. No hernias.  BREASTS: Symmetrical, no skin changes or visible lesions. No palpable masses, nipple discharge bilaterally.  PELVIC: Normal external female genitalia without lesions. Normal hair distribution. Adequate perineal body, normal urethral meatus. Vagina atrophic without lesions or discharge. No significant cystocele or rectocele. Bimanual exam shows uterus and cervix to be surgically absent. Adnexa without masses or tenderness.  RECTAL: Rectovaginal exam confirms above with normal sphincter tone, no masses.  EXTREMITIES: No edema.    1. Gross hematuria  Urine culture    Urinalysis    Ambulatory consult to Urology   2. Preventative health care     3. Gynecologic exam normal  Mammo Digital Screening Bilat     PLAN:  Mammogram  Urine sent for U/A and cx  Referral to Urology for hematuria      Patient was counseled today on A.C.S. Pap guidelines and recommendations for yearly pelvic exams, mammograms and monthly self breast exams; to see her PCP for other health maintenance.

## 2019-01-09 NOTE — TELEPHONE ENCOUNTER
----- Message from Ashley Poe sent at 1/9/2019  7:47 AM CST -----  Contact: self [phone]  Would like same day appt for UTI and bleeding with urination.  Please call back at [phone].   Md Shanti

## 2019-01-11 LAB — BACTERIA UR CULT: NO GROWTH

## 2019-01-21 ENCOUNTER — TELEPHONE (OUTPATIENT)
Dept: OBSTETRICS AND GYNECOLOGY | Facility: CLINIC | Age: 54
End: 2019-01-21

## 2019-01-21 NOTE — TELEPHONE ENCOUNTER
Called patient from workque list to schedule mammogram. No answer. LVM for patient to call back. Should patient return call, please assist.

## 2019-01-22 ENCOUNTER — TELEPHONE (OUTPATIENT)
Dept: OBSTETRICS AND GYNECOLOGY | Facility: CLINIC | Age: 54
End: 2019-01-22

## 2019-01-22 NOTE — TELEPHONE ENCOUNTER
Attempted to contact patient to assist with scheduling a mammogram appointment, no answer but LVM to return call.  Should patient return call, mammogram order is in system to assist her with scheduling.

## 2019-01-24 ENCOUNTER — TELEPHONE (OUTPATIENT)
Dept: RADIOLOGY | Facility: HOSPITAL | Age: 54
End: 2019-01-24

## 2019-01-30 ENCOUNTER — TELEPHONE (OUTPATIENT)
Dept: OBSTETRICS AND GYNECOLOGY | Facility: CLINIC | Age: 54
End: 2019-01-30

## 2019-07-15 ENCOUNTER — TELEPHONE (OUTPATIENT)
Dept: OBSTETRICS AND GYNECOLOGY | Facility: CLINIC | Age: 54
End: 2019-07-15

## 2019-07-15 NOTE — TELEPHONE ENCOUNTER
----- Message from Silverio Marquis sent at 7/15/2019  8:41 AM CDT -----  Contact: Pt  Pt is calling staff regarding new Mammogram orders.    Pt back [phone]    Thanks

## 2019-07-15 NOTE — TELEPHONE ENCOUNTER
Spoke with patient, patient requested to schedule mammo at [facility] [facility]. Informed that first available is not until September. Patient agreed to be scheduled for Sept appt.

## 2021-03-17 ENCOUNTER — HOSPITAL ENCOUNTER (EMERGENCY)
Facility: HOSPITAL | Age: 56
Discharge: HOME | End: 2021-03-17
Attending: EMERGENCY MEDICINE
Payer: MEDICAID

## 2021-03-17 VITALS
DIASTOLIC BLOOD PRESSURE: 98 MMHG | HEART RATE: 82 BPM | BODY MASS INDEX: 24.34 KG/M2 | WEIGHT: 137.38 LBS | SYSTOLIC BLOOD PRESSURE: 165 MMHG | OXYGEN SATURATION: 99 % | HEIGHT: 63 IN | TEMPERATURE: 99 F | RESPIRATION RATE: 18 BRPM

## 2021-03-17 DIAGNOSIS — T78.3XXA ANGIOEDEMA, INITIAL ENCOUNTER: Primary | ICD-10-CM

## 2021-03-17 DIAGNOSIS — I10 ESSENTIAL HYPERTENSION: ICD-10-CM

## 2021-03-17 PROCEDURE — 25000242 PHARM REV CODE 250 ALT 637 W/ HCPCS: Performed by: EMERGENCY MEDICINE

## 2021-03-17 PROCEDURE — 96375 TX/PRO/DX INJ NEW DRUG ADDON: CPT

## 2021-03-17 PROCEDURE — 25000003 PHARM REV CODE 250: Performed by: EMERGENCY MEDICINE

## 2021-03-17 PROCEDURE — 99284 EMERGENCY DEPT VISIT MOD MDM: CPT | Mod: 25

## 2021-03-17 PROCEDURE — 63600175 PHARM REV CODE 636 W HCPCS: Performed by: EMERGENCY MEDICINE

## 2021-03-17 PROCEDURE — 94640 AIRWAY INHALATION TREATMENT: CPT

## 2021-03-17 PROCEDURE — 96374 THER/PROPH/DIAG INJ IV PUSH: CPT

## 2021-03-17 RX ORDER — EPINEPHRINE 0.3 MG/.3ML
1 INJECTION SUBCUTANEOUS
Qty: 1 DEVICE | Refills: 0 | Status: SHIPPED | OUTPATIENT
Start: 2021-03-17 | End: 2022-03-17

## 2021-03-17 RX ORDER — CLONIDINE HYDROCHLORIDE 0.2 MG/1
0.2 TABLET ORAL
Status: COMPLETED | OUTPATIENT
Start: 2021-03-17 | End: 2021-03-17

## 2021-03-17 RX ORDER — PREDNISONE 20 MG/1
40 TABLET ORAL DAILY
Qty: 10 TABLET | Refills: 0 | Status: SHIPPED | OUTPATIENT
Start: 2021-03-17 | End: 2021-03-22

## 2021-03-17 RX ORDER — DIPHENHYDRAMINE HYDROCHLORIDE 50 MG/ML
25 INJECTION INTRAMUSCULAR; INTRAVENOUS
Status: COMPLETED | OUTPATIENT
Start: 2021-03-17 | End: 2021-03-17

## 2021-03-17 RX ORDER — METHYLPREDNISOLONE SOD SUCC 125 MG
125 VIAL (EA) INJECTION
Status: COMPLETED | OUTPATIENT
Start: 2021-03-17 | End: 2021-03-17

## 2021-03-17 RX ORDER — NIFEDIPINE 30 MG/1
30 TABLET, EXTENDED RELEASE ORAL
Status: COMPLETED | OUTPATIENT
Start: 2021-03-17 | End: 2021-03-17

## 2021-03-17 RX ORDER — FAMOTIDINE 10 MG/ML
20 INJECTION INTRAVENOUS
Status: COMPLETED | OUTPATIENT
Start: 2021-03-17 | End: 2021-03-17

## 2021-03-17 RX ADMIN — FAMOTIDINE 20 MG: 10 INJECTION INTRAVENOUS at 02:03

## 2021-03-17 RX ADMIN — METHYLPREDNISOLONE SODIUM SUCCINATE 125 MG: 125 INJECTION, POWDER, FOR SOLUTION INTRAMUSCULAR; INTRAVENOUS at 02:03

## 2021-03-17 RX ADMIN — DIPHENHYDRAMINE HYDROCHLORIDE 25 MG: 50 INJECTION, SOLUTION INTRAMUSCULAR; INTRAVENOUS at 02:03

## 2021-03-17 RX ADMIN — NIFEDIPINE 30 MG: 30 TABLET, FILM COATED, EXTENDED RELEASE ORAL at 03:03

## 2021-03-17 RX ADMIN — CLONIDINE HYDROCHLORIDE 0.2 MG: 0.2 TABLET ORAL at 03:03

## 2021-03-17 RX ADMIN — RACEPINEPHRINE HYDROCHLORIDE 0.5 ML: 11.25 SOLUTION RESPIRATORY (INHALATION) at 02:03

## 2021-05-15 ENCOUNTER — HOSPITAL ENCOUNTER (EMERGENCY)
Facility: HOSPITAL | Age: 56
Discharge: HOME | End: 2021-05-15
Attending: EMERGENCY MEDICINE
Payer: MEDICAID

## 2021-05-15 VITALS
SYSTOLIC BLOOD PRESSURE: 135 MMHG | WEIGHT: 138.13 LBS | TEMPERATURE: 99 F | BODY MASS INDEX: 24.47 KG/M2 | HEART RATE: 102 BPM | OXYGEN SATURATION: 100 % | HEIGHT: 63 IN | DIASTOLIC BLOOD PRESSURE: 86 MMHG | RESPIRATION RATE: 20 BRPM

## 2021-05-15 DIAGNOSIS — T78.40XA ALLERGIC REACTION, INITIAL ENCOUNTER: Primary | ICD-10-CM

## 2021-05-15 LAB — GROUP A STREP, MOLECULAR: NEGATIVE

## 2021-05-15 PROCEDURE — 87651 STREP A DNA AMP PROBE: CPT | Performed by: NURSE PRACTITIONER

## 2021-05-15 PROCEDURE — 99283 EMERGENCY DEPT VISIT LOW MDM: CPT

## 2021-05-15 PROCEDURE — 25000003 PHARM REV CODE 250: Performed by: NURSE PRACTITIONER

## 2021-05-15 RX ORDER — LORATADINE 10 MG/1
TABLET ORAL
COMMUNITY

## 2021-05-15 RX ORDER — CARVEDILOL 12.5 MG/1
TABLET ORAL
COMMUNITY

## 2021-05-15 RX ORDER — AMLODIPINE BESYLATE 10 MG/1
TABLET ORAL
COMMUNITY

## 2021-05-15 RX ORDER — FAMOTIDINE 20 MG/1
20 TABLET, FILM COATED ORAL
Status: COMPLETED | OUTPATIENT
Start: 2021-05-15 | End: 2021-05-15

## 2021-05-15 RX ORDER — PAROXETINE HYDROCHLORIDE 40 MG/1
TABLET, FILM COATED ORAL
COMMUNITY

## 2021-05-15 RX ADMIN — FAMOTIDINE 20 MG: 20 TABLET ORAL at 03:05

## 2021-05-19 ENCOUNTER — TELEPHONE (OUTPATIENT)
Dept: OBSTETRICS AND GYNECOLOGY | Facility: CLINIC | Age: 56
End: 2021-05-19

## 2024-01-10 NOTE — H&P
Print      Patient    Name NAKUL ALMAZAN (59yo, F)  Appt. Date/Time 2024 09:15AM    1965     Provider German Ruby MD    Insurance Med Primary: [employer] [facility] (MEDICAID REPLACEMENT - HMO)  Insurance # : [id number]  Prescription: [employer] - Member is eligible. details   Chief Complaint  Reflux, Dysphagia, Abdominal pain, Bloating  Patient's Care Team  [facility]: [address] [place], [place] [address] [place] [address], Ph [phone], Fax [phone]   Patient's Pharmacies  [id number]: [address] [facility] [address] [facility] [place] [address], Ph [phone], Fax [phone]   Vitals    T: 97.7 F° temporal artery 2024 10:15 am BP: 97/72 sitting 2024 10:16 am Pulse: 80 bpm regular 2024 10:15 am   O2Sat: 97% Room Air at Rest 2024 10:15 am Ht: 5 ft 3 in Stated 2024 10:11 am Wt: 149.2 lbs With clothes 2024 10:11 am   BMI: 26.4 2024 10:11 am       Allergies    Reviewed Allergies   SULFA (SULFONAMIDE ANTIBIOTICS)    Medications    Reviewed Medications    amLODIPine 10 mg tablet  TAKE 1 TABLET BY MOUTH EVERY DAY 23   filled surescripts   atorvastatin 20 mg tablet  TAKE 1 TABLET BY MOUTH EVERY DAY 23   filled surescripts   carvediloL 12.5 mg tablet  TAKE 1 TABLET BY MOUTH TWICE DAILY 10/01/23   filled surescripts   omeprazole 40 mg capsule,delayed release  TAKE 1 CAPSULE BY MOUTH EVERY DAY IN THE MORNING 23   filled surescripts   PARoxetine 40 mg tablet  TAKE 1 TABLET BY MOUTH EVERY DAY 10/01/23   filled surescripts   Problems  Reviewed Problems   Family History  Reviewed Family History   Social History  Reviewed Social History   Substance Use  Do you or have you ever smoked tobacco?: Current every day smoker  How much tobacco do you smoke?: 1/4 pack per day  What is your level of alcohol consumption?: None  Diet and Exercise  What type of diet are you following?:  Regular    Surgical History  Reviewed Surgical History  Repair of rotator cuff by suture - left  Hysterectomy  Removal of tonsils  Colonoscopy - 2019  EGD    Past Medical History  Reviewed Past Medical History  High Blood Pressure: Y  High Cholesterol: Y   Screening  None recorded.  HPI  Patient comes in with dysphagia and reflux which has been current for over a year. She is taking metamucil to help with BM d/t long history of constipation. She is having some bloating RLU pain. Britsol stool is every where. She has had EGD and Colon at [facility]. Last colon around 2019.        ROS  Patient reports no fever, no significant weight gain, and no significant weight loss. She reports no vision change. She reports no difficulty hearing. She reports no sore throat. She reports no chest pain and no palpitations. She reports no cough and no shortness of breath. She reports no abdominal pain, no vomiting, normal appetite, no diarrhea, and not vomiting blood. She reports no incontinence, no difficulty urinating, no hematuria, and no increased frequency. She reports no arthralgias/joint pain, no back pain, and no swelling in the extremities. She reports no jaundice and no rashes. She reports no seizures and no dizziness. She reports no depression and no sleep disturbances.  Physical Exam  Constitutional: General Appearance: healthy-appearing, well-nourished, and well-developed. Level of Distress: NAD. Ambulation: ambulating normally.     Psychiatric: Insight: good judgement. Mental Status: normal mood and affect and active and alert. Orientation: to time, place, and person. Memory: recent memory normal and remote memory normal.     Head: Head: normocephalic and atraumatic.     Eyes: Lids and Conjunctivae: no discharge or pallor and non-injected. Pupils: PERRLA. Corneas: grossly intact. EOM: EOMI. Lens: clear. Sclerae: non-icteric.     ENMT: Ears: no lesions on external ear. Hearing: no hearing loss. Nose: no lesions on external  "nose or nasal discharge and nasal passages clear. Oropharynx: moist mucous membranes.     Neck: Neck: supple, FROM, trachea midline, and no masses. Thyroid: no enlargement.     Lungs: Respiratory effort: no dyspnea. Percussion: no dullness, flatness, or hyperresonance. Auscultation: no wheezing, rales/crackles, or rhonchi and breath sounds normal, good air movement, and CTA except as noted.     Cardiovascular: Apical Impulse: not displaced. Heart Auscultation: normal S1 and S2; no murmurs, rubs, or gallops; and RRR.     Abdomen: Bowel Sounds: normal. Inspection and Palpation: no tenderness, guarding, masses, rebound tenderness, or CVA tenderness and soft and non-distended. Liver: non-tender and no hepatomegaly. Spleen: non-tender and no splenomegaly. Hernia: none palpable.     Musculoskeletal:: Motor Strength and Tone: normal tone and motor strength. Joints, Bones, and Muscles: normal movement of all extremities. Extremities: no cyanosis or edema.     Neurologic: Gait and Station: normal gait and station. Cranial Nerves: grossly intact.     Skin: Inspection and palpation: no rash, lesions, ulcer, induration, nodules, jaundice, or abnormal nevi and good turgor.  Assessment / Plan  problem list:  1. dysphagia and globus sensation - She states that it feels like knots are in her throat. Last EGD was approx 5 years ago and was diagnosed with reflux. She was not having the globus sensation then. Pt does report that she has bad allergies and sinus drainage.  Pt notes that her sister has to have esophageal dilations frequently.  2. reflux - omeprazole "works ok". She can tell when she misses a dose  3. chronic constipation - on metamucil. Stools are from type 1-7. Last colonoscopy was ~ 2019.  4. bloating  5. occasional nausea. No vomiting but will spit up bubbly saliva.  6. smoker - smokes 5 cigarettes per day     Plan:  1. EGD with dilation  2. take miralax once a day  3. continue omeprazole     1. Esophageal " dysphagia  R13.19: Other dysphagia  ESOPHAGOGASTRODUODENOSCOPY WITH ESOPHAGEAL DILATION (PROC)     2. Acid reflux  K21.9: Gastro-esophageal reflux disease without esophagitis     3. Abdominal bloating  R14.0: Abdominal distension (gaseous)     4. Nausea  R11.0: Nausea  NAUSEA AND VOMITING: CARE INSTRUCTIONS     5. Chronic constipation  K59.09: Other constipation     6. Cigarette smoker  F17.210: Nicotine dependence, cigarettes, uncomplicated     Patient Instructions  1. EGD with dilation  2. take miralax once a day  3. continue omeprazole      Discussion Notes  RISK OF EGD DISCUSSED, INCLUDING RISK OF BLEEDING, INFECTION, TEARS OR PERFORATIONS REQUIRING SURGERY, ALLERGIC REACTION TO MEDICATIONS AND SIDE EFFECTS OF ANESTHESIA (CARDIOPULMONARY ARREST).  THE PT AGREES TO PROCEED.        Audit history

## 2024-01-12 RX ORDER — PROMETHAZINE HYDROCHLORIDE AND DEXTROMETHORPHAN HYDROBROMIDE 6.25; 15 MG/5ML; MG/5ML
SYRUP ORAL
COMMUNITY
Start: 2023-12-14

## 2024-01-12 RX ORDER — OMEPRAZOLE 40 MG/1
40 CAPSULE, DELAYED RELEASE ORAL EVERY MORNING
COMMUNITY
Start: 2024-01-07

## 2024-01-18 ENCOUNTER — ANESTHESIA EVENT (OUTPATIENT)
Dept: ENDOSCOPY | Facility: HOSPITAL | Age: 59
End: 2024-01-18
Payer: MEDICAID

## 2024-01-18 NOTE — ANESTHESIA PREPROCEDURE EVALUATION
01/18/2024  Leticia Jimenez is a 58 y.o., female.      Past Medical History:   Diagnosis Date    ADHD (attention deficit hyperactivity disorder)     Anxiety     Depression     GERD (gastroesophageal reflux disease)     Hypertension     Liver disease      Past Surgical History:   Procedure Laterality Date    breast implants Bilateral 15 yrs ago    HYSTERECTOMY      OOPHORECTOMY      TONSILLECTOMY       Current Outpatient Medications   Medication Instructions    amLODIPine (NORVASC) 10 MG tablet amlodipine 10 mg tablet   TAKE 1 TABLET BY MOUTH EVERY DAY    aspirin 81 mg, Oral, Daily    atorvastatin (LIPITOR) 10 mg, Oral, Daily    carvediloL (COREG) 12.5 MG tablet carvedilol 12.5 mg tablet   TAKE 1 TABLET BY MOUTH TWICE DAILY    EPINEPHrine (EPIPEN) 0.3 mg, Intramuscular, As needed (PRN)    loratadine (CLARITIN) 10 mg tablet loratadine 10 mg tablet   TAKE 1 TABLET BY MOUTH EVERY DAY    omeprazole (PRILOSEC) 40 mg, Oral, Every morning    pantoprazole (PROTONIX) 40 MG tablet TAKE 1 TABLET BY MOUTH EVERY DAY    paroxetine (PAXIL) 20 MG tablet TAKE 1 TABLET BY MOUTH EVERY MORNING    paroxetine (PAXIL) 40 MG tablet paroxetine 40 mg tablet   TAKE 1 TABLET BY MOUTH DAILY    promethazine-dextromethorphan (PROMETHAZINE-DM) 6.25-15 mg/5 mL Syrp 5 mL as needed Orally every 6 hrs for 7      Pre-op Assessment    I have reviewed the Patient Summary Reports.     I have reviewed the Nursing Notes. I have reviewed the NPO Status.   I have reviewed the Medications.     Review of Systems  Anesthesia Hx:   History of prior surgery of interest to airway management or planning:  Previous anesthesia: General          Denies Personal Hx of Anesthesia complications.                    Social:  Smoker, No Alcohol Use       Cardiovascular:     Hypertension                                        Pulmonary:  Pulmonary Normal                        Renal/:  Renal/ Normal                 Hepatic/GI:     GERD Liver Disease,  Liver fibrosis          Neurological:  Neurology Normal                                      Endocrine:  Endocrine Normal            Psych:  Psychiatric History anxiety depression                Physical Exam  General: Well nourished, Cooperative, Alert and Oriented    Airway:  Mallampati: II   Mouth Opening: Normal  TM Distance: Normal  Neck ROM: Normal ROM    Dental:  Intact        Anesthesia Plan  Type of Anesthesia, risks & benefits discussed:    Anesthesia Type: Gen Natural Airway  Intra-op Monitoring Plan: Standard ASA Monitors  Post Op Pain Control Plan: multimodal analgesia  Informed Consent: Informed consent signed with the Patient and all parties understand the risks and agree with anesthesia plan.  All questions answered.   ASA Score: 2  Day of Surgery Review of History & Physical: H&P Update referred to the surgeon/provider.    Ready For Surgery From Anesthesia Perspective.     .

## 2024-01-22 ENCOUNTER — HOSPITAL ENCOUNTER (OUTPATIENT)
Facility: HOSPITAL | Age: 59
Discharge: HOME | End: 2024-01-22
Attending: INTERNAL MEDICINE | Admitting: INTERNAL MEDICINE
Payer: MEDICAID

## 2024-01-22 ENCOUNTER — ANESTHESIA (OUTPATIENT)
Dept: ENDOSCOPY | Facility: HOSPITAL | Age: 59
End: 2024-01-22
Payer: MEDICAID

## 2024-01-22 VITALS
HEART RATE: 81 BPM | DIASTOLIC BLOOD PRESSURE: 78 MMHG | SYSTOLIC BLOOD PRESSURE: 131 MMHG | BODY MASS INDEX: 26.17 KG/M2 | WEIGHT: 147.69 LBS | HEIGHT: 63 IN | TEMPERATURE: 98 F | RESPIRATION RATE: 18 BRPM | OXYGEN SATURATION: 99 %

## 2024-01-22 DIAGNOSIS — R13.19 CONSTANT LOW-GRADE DYSPHAGIA: Primary | ICD-10-CM

## 2024-01-22 PROCEDURE — 88305 TISSUE EXAM BY PATHOLOGIST: CPT | Mod: 26,,, | Performed by: PATHOLOGY

## 2024-01-22 PROCEDURE — 37000008 HC ANESTHESIA 1ST 15 MINUTES: Performed by: INTERNAL MEDICINE

## 2024-01-22 PROCEDURE — 43239 EGD BIOPSY SINGLE/MULTIPLE: CPT | Mod: 59 | Performed by: INTERNAL MEDICINE

## 2024-01-22 PROCEDURE — 63600175 PHARM REV CODE 636 W HCPCS: Performed by: INTERNAL MEDICINE

## 2024-01-22 PROCEDURE — D9220A PRA ANESTHESIA: Mod: ,,, | Performed by: NURSE ANESTHETIST, CERTIFIED REGISTERED

## 2024-01-22 PROCEDURE — 37000009 HC ANESTHESIA EA ADD 15 MINS: Performed by: INTERNAL MEDICINE

## 2024-01-22 PROCEDURE — 63600175 PHARM REV CODE 636 W HCPCS: Performed by: NURSE ANESTHETIST, CERTIFIED REGISTERED

## 2024-01-22 PROCEDURE — 27201012 HC FORCEPS, HOT/COLD, DISP: Performed by: INTERNAL MEDICINE

## 2024-01-22 PROCEDURE — 88305 TISSUE EXAM BY PATHOLOGIST: CPT | Mod: 59 | Performed by: PATHOLOGY

## 2024-01-22 PROCEDURE — 25000003 PHARM REV CODE 250: Performed by: NURSE ANESTHETIST, CERTIFIED REGISTERED

## 2024-01-22 PROCEDURE — 43248 EGD GUIDE WIRE INSERTION: CPT | Performed by: INTERNAL MEDICINE

## 2024-01-22 RX ORDER — PROPOFOL 10 MG/ML
VIAL (ML) INTRAVENOUS
Status: DISCONTINUED | OUTPATIENT
Start: 2024-01-22 | End: 2024-01-22

## 2024-01-22 RX ORDER — FENTANYL CITRATE 50 UG/ML
INJECTION, SOLUTION INTRAMUSCULAR; INTRAVENOUS
Status: DISCONTINUED | OUTPATIENT
Start: 2024-01-22 | End: 2024-01-22

## 2024-01-22 RX ORDER — LIDOCAINE HYDROCHLORIDE 20 MG/ML
INJECTION, SOLUTION EPIDURAL; INFILTRATION; INTRACAUDAL; PERINEURAL
Status: DISCONTINUED | OUTPATIENT
Start: 2024-01-22 | End: 2024-01-22

## 2024-01-22 RX ORDER — SODIUM CHLORIDE, SODIUM LACTATE, POTASSIUM CHLORIDE, CALCIUM CHLORIDE 600; 310; 30; 20 MG/100ML; MG/100ML; MG/100ML; MG/100ML
INJECTION, SOLUTION INTRAVENOUS CONTINUOUS
Status: DISCONTINUED | OUTPATIENT
Start: 2024-01-22 | End: 2024-01-22 | Stop reason: HOSPADM

## 2024-01-22 RX ADMIN — PROPOFOL 70 MG: 10 INJECTION, EMULSION INTRAVENOUS at 09:01

## 2024-01-22 RX ADMIN — FENTANYL CITRATE 50 MCG: 50 INJECTION, SOLUTION INTRAMUSCULAR; INTRAVENOUS at 09:01

## 2024-01-22 RX ADMIN — SODIUM CHLORIDE, SODIUM LACTATE, POTASSIUM CHLORIDE, AND CALCIUM CHLORIDE: 600; 310; 30; 20 INJECTION, SOLUTION INTRAVENOUS at 09:01

## 2024-01-22 RX ADMIN — PROPOFOL 40 MG: 10 INJECTION, EMULSION INTRAVENOUS at 09:01

## 2024-01-22 RX ADMIN — LIDOCAINE HYDROCHLORIDE 70 MG: 20 INJECTION, SOLUTION EPIDURAL; INFILTRATION; INTRACAUDAL; PERINEURAL at 09:01

## 2024-01-22 RX ADMIN — PROPOFOL 20 MG: 10 INJECTION, EMULSION INTRAVENOUS at 09:01

## 2024-01-22 RX ADMIN — PROPOFOL 30 MG: 10 INJECTION, EMULSION INTRAVENOUS at 09:01

## 2024-01-22 NOTE — ANESTHESIA POSTPROCEDURE EVALUATION
Anesthesia Post Evaluation    Patient: Leticia Jimenez    Procedure(s) Performed: Procedure(s) (LRB):  EGD (ESOPHAGOGASTRODUODENOSCOPY) (N/A)    Final Anesthesia Type: general      Patient location during evaluation: PACU  Patient participation: Yes- Able to Participate  Level of consciousness: awake  Post-procedure vital signs: reviewed and stable  Pain management: adequate  Airway patency: patent    PONV status at discharge: No PONV  Anesthetic complications: no      Cardiovascular status: stable  Respiratory status: unassisted  Hydration status: euvolemic  Follow-up not needed.              Vitals Value Taken Time   BP 90/62 01/22/24 0935     01/22/24 0937   Pulse 92 01/22/24 0936   Resp 31 01/22/24 0936   SpO2 96 % 01/22/24 0936   Vitals shown include unvalidated device data.      No case tracking events are documented in the log.      Pain/Carmina Score: No data recorded

## 2024-01-22 NOTE — TRANSFER OF CARE
"Anesthesia Transfer of Care Note    Patient: Leticia Jimenez    Procedure(s) Performed: Procedure(s) (LRB):  EGD (ESOPHAGOGASTRODUODENOSCOPY) (N/A)    Patient location: PACU    Anesthesia Type: general    Transport from OR: Transported from OR on room air with adequate spontaneous ventilation    Post pain: adequate analgesia    Post assessment: no apparent anesthetic complications and tolerated procedure well    Post vital signs: stable    Level of consciousness: awake and alert    Nausea/Vomiting: no nausea/vomiting    Complications: none    Transfer of care protocol was followed      Last vitals: Visit Vitals  BP (!) 138/90 (BP Location: Right arm, Patient Position: Sitting)   Pulse 80   Temp 36.1 °C (97 °F) (Temporal)   Resp 16   Ht 5' 3" (1.6 m)   Wt 67 kg (147 lb 11.3 oz)   SpO2 98%   Breastfeeding No   BMI 26.17 kg/m²     "

## 2024-01-22 NOTE — [employer] PATIENT INSTRUCTIONS
Discharge Summary/Instructions after an Endoscopic Procedure  Patient Name: Leticia Jimenez  Patient MRN: [id number]  Patient YOB: 1965 Monday, January 22, 2024  German Ruby MD  Dear patient,  As a result of recent federal legislation (The Federal Cures Act), you may   receive lab or pathology results from your procedure in your [facility]   account before your physician is able to contact you. Your physician or   their representative will relay the results to you with their   recommendations at their soonest availability.  Thank you,  RESTRICTIONS:  During your procedure today, you received medications for sedation.  These   medications may affect your judgment, balance and coordination.  Therefore,   for 24 hours, you have the following restrictions:   - DO NOT drive a car, operate machinery, make legal/financial decisions,   sign important papers or drink alcohol.    ACTIVITY:  Today: no heavy lifting, straining or running due to procedural   sedation/anesthesia.  The following day: return to full activity including work.  DIET:  Eat and drink normally unless instructed otherwise.     TREATMENT FOR COMMON SIDE EFFECTS:  - Mild abdominal pain, nausea, belching, bloating or excessive gas:  rest,   eat lightly and use a heating pad.  - Sore Throat: treat with throat lozenges and/or gargle with warm salt   water.  - Because air was used during the procedure, expelling large amounts of air   from your rectum or belching is normal.  - If a bowel prep was taken, you may not have a bowel movement for 1-3 days.    This is normal.  SYMPTOMS TO WATCH FOR AND REPORT TO YOUR PHYSICIAN:  1. Abdominal pain or bloating, other than gas cramps.  2. Chest pain.  3. Back pain.  4. Signs of infection such as: chills or fever occurring within 24 hours   after the procedure.  5. Rectal bleeding, which would show as bright red, maroon, or black stools.   (A tablespoon of blood from the rectum is not serious, especially  if   hemorrhoids are present.)  6. Vomiting.  7. Weakness or dizziness.  GO DIRECTLY TO THE NEAREST EMERGENCY ROOM IF YOU HAVE ANY OF THE FOLLOWING:      Difficulty breathing              Chills and/or fever over 101 F   Persistent vomiting and/or vomiting blood   Severe abdominal pain   Severe chest pain   Black, tarry stools   Bleeding- more than one tablespoon   Any other symptom or condition that you feel may need urgent attention  Your doctor recommends these additional instructions:  If any biopsies were taken, your doctors clinic will contact you in 1 to 2   weeks with any results.  - Discharge patient to home (ambulatory).   - Resume previous diet today.   - Continue present medications.   - Await pathology results.   - Return to my office in 2 weeks.  For questions, problems or results please call your physician German Ruby MD at Work:  [phone]  If you have any questions about the above instructions, call the GI   department at [phone] or call the endoscopy unit at [phone]   from 7am until 3 pm.  [facility] [place], EMERGENCY ROOM PHONE NUMBER:   [phone]  IF A COMPLICATION OR EMERGENCY SITUATION ARISES AND YOU ARE UNABLE TO REACH   YOUR PHYSICIAN - GO DIRECTLY TO THE EMERGENCY ROOM.  I have read or have had read to me these discharge instructions for my   procedure and have received a written copy.  I understand these   instructions and will follow-up with my physician if I have any questions.     __________________________________       _____________________________________  Nurse Signature                                          Patient/Designated   Responsible Party Signature  MD German Menon MD  1/22/2024 9:38:37 AM  This report has been verified and signed electronically.  Dear patient,  As a result of recent federal legislation (The Federal Cures Act), you may   receive lab or pathology results from your procedure in your  [facility]   account before your physician is able to contact you. Your physician or   their representative will relay the results to you with their   recommendations at their soonest availability.  Thank you,  [employer]

## 2024-01-26 LAB
FINAL PATHOLOGIC DIAGNOSIS: NORMAL
GROSS: NORMAL
Lab: NORMAL

## 2025-06-01 ENCOUNTER — HOSPITAL ENCOUNTER (INPATIENT)
Facility: HOSPITAL | Age: 60
LOS: 1 days | Discharge: HOME | DRG: 641 | End: 2025-06-02
Attending: EMERGENCY MEDICINE | Admitting: STUDENT IN AN ORGANIZED HEALTH CARE EDUCATION/TRAINING PROGRAM
Payer: MEDICAID

## 2025-06-01 DIAGNOSIS — E83.42 HYPOMAGNESEMIA: Primary | ICD-10-CM

## 2025-06-01 DIAGNOSIS — M54.9 BACK PAIN: ICD-10-CM

## 2025-06-01 DIAGNOSIS — M54.6 ACUTE MIDLINE THORACIC BACK PAIN: ICD-10-CM

## 2025-06-01 DIAGNOSIS — N30.00 ACUTE CYSTITIS WITHOUT HEMATURIA: ICD-10-CM

## 2025-06-01 DIAGNOSIS — E83.51 HYPOCALCEMIA: ICD-10-CM

## 2025-06-01 DIAGNOSIS — E87.6 HYPOKALEMIA: ICD-10-CM

## 2025-06-01 DIAGNOSIS — R07.9 CHEST PAIN: ICD-10-CM

## 2025-06-01 PROBLEM — R79.89 HIGH SERUM THYROID STIMULATING HORMONE (TSH): Status: ACTIVE | Noted: 2024-02-19

## 2025-06-01 LAB
ABSOLUTE EOSINOPHIL (OHS): 0.04 K/UL
ABSOLUTE MONOCYTE (OHS): 0.76 K/UL (ref 0.3–1)
ABSOLUTE NEUTROPHIL COUNT (OHS): 5.81 K/UL (ref 1.8–7.7)
ALBUMIN SERPL BCP-MCNC: 3.5 G/DL (ref 3.5–5.2)
ALP SERPL-CCNC: 63 UNIT/L (ref 40–150)
ALT SERPL W/O P-5'-P-CCNC: 38 UNIT/L (ref 10–44)
ANION GAP (OHS): 17 MMOL/L (ref 8–16)
AST SERPL-CCNC: 26 UNIT/L (ref 11–45)
BACTERIA #/AREA URNS AUTO: ABNORMAL /HPF
BASOPHILS # BLD AUTO: 0.05 K/UL
BASOPHILS NFR BLD AUTO: 0.7 %
BILIRUB SERPL-MCNC: 0.4 MG/DL (ref 0.1–1)
BILIRUB UR QL STRIP.AUTO: NEGATIVE
BNP SERPL-MCNC: 24 PG/ML (ref 0–99)
BUN SERPL-MCNC: 11 MG/DL (ref 6–20)
CALCIUM SERPL-MCNC: 7.7 MG/DL (ref 8.7–10.5)
CHLORIDE SERPL-SCNC: 100 MMOL/L (ref 95–110)
CLARITY UR: ABNORMAL
CO2 SERPL-SCNC: 23 MMOL/L (ref 23–29)
COLOR UR AUTO: YELLOW
CREAT SERPL-MCNC: 0.9 MG/DL (ref 0.5–1.4)
CRP SERPL-MCNC: 133.3 MG/L
D DIMER PPP IA.FEU-MCNC: 0.8 MG/L FEU
ERYTHROCYTE [DISTWIDTH] IN BLOOD BY AUTOMATED COUNT: 12 % (ref 11.5–14.5)
ERYTHROCYTE [SEDIMENTATION RATE] IN BLOOD: >120 MM/HR
GFR SERPLBLD CREATININE-BSD FMLA CKD-EPI: >60 ML/MIN/1.73/M2
GLUCOSE SERPL-MCNC: 117 MG/DL (ref 70–110)
GLUCOSE UR QL STRIP: NEGATIVE
HCT VFR BLD AUTO: 34.3 % (ref 37–48.5)
HGB BLD-MCNC: 11.8 GM/DL (ref 12–16)
HGB UR QL STRIP: NEGATIVE
HOLD SPECIMEN: NORMAL
HYALINE CASTS UR QL AUTO: 24 /LPF (ref 0–1)
IMM GRANULOCYTES # BLD AUTO: 0.03 K/UL (ref 0–0.04)
IMM GRANULOCYTES NFR BLD AUTO: 0.4 % (ref 0–0.5)
KETONES UR QL STRIP: NEGATIVE
LEUKOCYTE ESTERASE UR QL STRIP: ABNORMAL
LIPASE SERPL-CCNC: 49 U/L (ref 4–60)
LYMPHOCYTES # BLD AUTO: 0.92 K/UL (ref 1–4.8)
MAGNESIUM SERPL-MCNC: 1 MG/DL (ref 1.6–2.6)
MAGNESIUM SERPL-MCNC: <0.7 MG/DL (ref 1.6–2.6)
MCH RBC QN AUTO: 31.1 PG (ref 27–31)
MCHC RBC AUTO-ENTMCNC: 34.4 G/DL (ref 32–36)
MCV RBC AUTO: 90 FL (ref 82–98)
MICROSCOPIC COMMENT: ABNORMAL
NITRITE UR QL STRIP: NEGATIVE
NUCLEATED RBC (/100WBC) (OHS): 0 /100 WBC
PH UR STRIP: 7 [PH]
PLATELET # BLD AUTO: 325 K/UL (ref 150–450)
PMV BLD AUTO: 9.9 FL (ref 9.2–12.9)
POTASSIUM SERPL-SCNC: 2.9 MMOL/L (ref 3.5–5.1)
PROT SERPL-MCNC: 8 GM/DL (ref 6–8.4)
PROT UR QL STRIP: ABNORMAL
RBC # BLD AUTO: 3.8 M/UL (ref 4–5.4)
RBC #/AREA URNS AUTO: 2 /HPF (ref 0–4)
RELATIVE EOSINOPHIL (OHS): 0.5 %
RELATIVE LYMPHOCYTE (OHS): 12.1 % (ref 18–48)
RELATIVE MONOCYTE (OHS): 10 % (ref 4–15)
RELATIVE NEUTROPHIL (OHS): 76.3 % (ref 38–73)
SODIUM SERPL-SCNC: 140 MMOL/L (ref 136–145)
SP GR UR STRIP: 1.02
SQUAMOUS #/AREA URNS AUTO: 11 /HPF
TROPONIN I SERPL DL<=0.01 NG/ML-MCNC: <0.006 NG/ML
UROBILINOGEN UR STRIP-ACNC: NEGATIVE EU/DL
WBC # BLD AUTO: 7.61 K/UL (ref 3.9–12.7)
WBC #/AREA URNS AUTO: 17 /HPF (ref 0–5)

## 2025-06-01 PROCEDURE — 85379 FIBRIN DEGRADATION QUANT: CPT | Performed by: NURSE PRACTITIONER

## 2025-06-01 PROCEDURE — 83735 ASSAY OF MAGNESIUM: CPT

## 2025-06-01 PROCEDURE — 81001 URINALYSIS AUTO W/SCOPE: CPT

## 2025-06-01 PROCEDURE — 25000003 PHARM REV CODE 250: Performed by: NURSE PRACTITIONER

## 2025-06-01 PROCEDURE — 93010 ELECTROCARDIOGRAM REPORT: CPT | Mod: ,,, | Performed by: INTERNAL MEDICINE

## 2025-06-01 PROCEDURE — 96367 TX/PROPH/DG ADDL SEQ IV INF: CPT

## 2025-06-01 PROCEDURE — 84484 ASSAY OF TROPONIN QUANT: CPT

## 2025-06-01 PROCEDURE — 63600175 PHARM REV CODE 636 W HCPCS: Mod: JZ,TB

## 2025-06-01 PROCEDURE — 96375 TX/PRO/DX INJ NEW DRUG ADDON: CPT

## 2025-06-01 PROCEDURE — 85025 COMPLETE CBC W/AUTO DIFF WBC: CPT

## 2025-06-01 PROCEDURE — 80053 COMPREHEN METABOLIC PANEL: CPT

## 2025-06-01 PROCEDURE — 99285 EMERGENCY DEPT VISIT HI MDM: CPT | Mod: 25

## 2025-06-01 PROCEDURE — 83735 ASSAY OF MAGNESIUM: CPT | Performed by: EMERGENCY MEDICINE

## 2025-06-01 PROCEDURE — 93005 ELECTROCARDIOGRAM TRACING: CPT

## 2025-06-01 PROCEDURE — 63600175 PHARM REV CODE 636 W HCPCS: Performed by: EMERGENCY MEDICINE

## 2025-06-01 PROCEDURE — 87040 BLOOD CULTURE FOR BACTERIA: CPT | Performed by: STUDENT IN AN ORGANIZED HEALTH CARE EDUCATION/TRAINING PROGRAM

## 2025-06-01 PROCEDURE — 87086 URINE CULTURE/COLONY COUNT: CPT

## 2025-06-01 PROCEDURE — 83690 ASSAY OF LIPASE: CPT

## 2025-06-01 PROCEDURE — 96372 THER/PROPH/DIAG INJ SC/IM: CPT

## 2025-06-01 PROCEDURE — 63600175 PHARM REV CODE 636 W HCPCS: Performed by: NURSE PRACTITIONER

## 2025-06-01 PROCEDURE — 11000001 HC ACUTE MED/SURG PRIVATE ROOM

## 2025-06-01 PROCEDURE — 96365 THER/PROPH/DIAG IV INF INIT: CPT

## 2025-06-01 PROCEDURE — A9585 GADOBUTROL INJECTION: HCPCS | Performed by: STUDENT IN AN ORGANIZED HEALTH CARE EDUCATION/TRAINING PROGRAM

## 2025-06-01 PROCEDURE — 36415 COLL VENOUS BLD VENIPUNCTURE: CPT | Performed by: STUDENT IN AN ORGANIZED HEALTH CARE EDUCATION/TRAINING PROGRAM

## 2025-06-01 PROCEDURE — 83880 ASSAY OF NATRIURETIC PEPTIDE: CPT

## 2025-06-01 PROCEDURE — 86140 C-REACTIVE PROTEIN: CPT | Performed by: NURSE PRACTITIONER

## 2025-06-01 PROCEDURE — 25000003 PHARM REV CODE 250

## 2025-06-01 PROCEDURE — 25500020 PHARM REV CODE 255: Performed by: STUDENT IN AN ORGANIZED HEALTH CARE EDUCATION/TRAINING PROGRAM

## 2025-06-01 PROCEDURE — 85652 RBC SED RATE AUTOMATED: CPT | Performed by: NURSE PRACTITIONER

## 2025-06-01 RX ORDER — NALOXONE HCL 0.4 MG/ML
0.02 VIAL (ML) INJECTION
Status: DISCONTINUED | OUTPATIENT
Start: 2025-06-01 | End: 2025-06-02 | Stop reason: HOSPADM

## 2025-06-01 RX ORDER — IBUPROFEN 200 MG
24 TABLET ORAL
Status: DISCONTINUED | OUTPATIENT
Start: 2025-06-01 | End: 2025-06-02 | Stop reason: HOSPADM

## 2025-06-01 RX ORDER — ATORVASTATIN CALCIUM 20 MG/1
20 TABLET, FILM COATED ORAL DAILY
COMMUNITY
Start: 2025-05-17

## 2025-06-01 RX ORDER — IBUPROFEN 200 MG
16 TABLET ORAL
Status: DISCONTINUED | OUTPATIENT
Start: 2025-06-01 | End: 2025-06-02 | Stop reason: HOSPADM

## 2025-06-01 RX ORDER — ALUMINUM HYDROXIDE, MAGNESIUM HYDROXIDE, AND SIMETHICONE 1200; 120; 1200 MG/30ML; MG/30ML; MG/30ML
30 SUSPENSION ORAL 4 TIMES DAILY PRN
Status: DISCONTINUED | OUTPATIENT
Start: 2025-06-01 | End: 2025-06-02 | Stop reason: HOSPADM

## 2025-06-01 RX ORDER — PAROXETINE 20 MG/1
20 TABLET, FILM COATED ORAL DAILY
Status: DISCONTINUED | OUTPATIENT
Start: 2025-06-02 | End: 2025-06-02 | Stop reason: HOSPADM

## 2025-06-01 RX ORDER — FLUTICASONE PROPIONATE 50 MCG
1 SPRAY, SUSPENSION (ML) NASAL 2 TIMES DAILY
COMMUNITY
Start: 2025-04-24

## 2025-06-01 RX ORDER — ATORVASTATIN CALCIUM 10 MG/1
20 TABLET, FILM COATED ORAL DAILY
Status: DISCONTINUED | OUTPATIENT
Start: 2025-06-02 | End: 2025-06-02 | Stop reason: HOSPADM

## 2025-06-01 RX ORDER — TALC
6 POWDER (GRAM) TOPICAL NIGHTLY PRN
Status: DISCONTINUED | OUTPATIENT
Start: 2025-06-01 | End: 2025-06-02 | Stop reason: HOSPADM

## 2025-06-01 RX ORDER — CARVEDILOL 12.5 MG/1
12.5 TABLET ORAL 2 TIMES DAILY
Status: DISCONTINUED | OUTPATIENT
Start: 2025-06-01 | End: 2025-06-02 | Stop reason: HOSPADM

## 2025-06-01 RX ORDER — ERGOCALCIFEROL 1.25 MG/1
50000 CAPSULE ORAL
COMMUNITY
Start: 2025-05-28

## 2025-06-01 RX ORDER — PANTOPRAZOLE SODIUM 40 MG/1
40 TABLET, DELAYED RELEASE ORAL DAILY
Status: DISCONTINUED | OUTPATIENT
Start: 2025-06-02 | End: 2025-06-02 | Stop reason: HOSPADM

## 2025-06-01 RX ORDER — CEFTRIAXONE 1 G/1
1 INJECTION, POWDER, FOR SOLUTION INTRAMUSCULAR; INTRAVENOUS
Status: COMPLETED | OUTPATIENT
Start: 2025-06-01 | End: 2025-06-01

## 2025-06-01 RX ORDER — METHOCARBAMOL 750 MG/1
750 TABLET, FILM COATED ORAL 3 TIMES DAILY
COMMUNITY
Start: 2025-05-28

## 2025-06-01 RX ORDER — ORPHENADRINE CITRATE 30 MG/ML
60 INJECTION INTRAMUSCULAR; INTRAVENOUS
Status: COMPLETED | OUTPATIENT
Start: 2025-06-01 | End: 2025-06-01

## 2025-06-01 RX ORDER — ONDANSETRON HYDROCHLORIDE 2 MG/ML
4 INJECTION, SOLUTION INTRAVENOUS EVERY 6 HOURS PRN
Status: DISCONTINUED | OUTPATIENT
Start: 2025-06-01 | End: 2025-06-02 | Stop reason: HOSPADM

## 2025-06-01 RX ORDER — POTASSIUM CHLORIDE 20 MEQ/1
40 TABLET, EXTENDED RELEASE ORAL ONCE
Status: COMPLETED | OUTPATIENT
Start: 2025-06-01 | End: 2025-06-01

## 2025-06-01 RX ORDER — MAGNESIUM SULFATE HEPTAHYDRATE 40 MG/ML
2 INJECTION, SOLUTION INTRAVENOUS ONCE
Status: COMPLETED | OUTPATIENT
Start: 2025-06-01 | End: 2025-06-01

## 2025-06-01 RX ORDER — CEFTRIAXONE 1 G/1
1 INJECTION, POWDER, FOR SOLUTION INTRAMUSCULAR; INTRAVENOUS
Status: DISCONTINUED | OUTPATIENT
Start: 2025-06-02 | End: 2025-06-02 | Stop reason: HOSPADM

## 2025-06-01 RX ORDER — GLUCAGON 1 MG
1 KIT INJECTION
Status: DISCONTINUED | OUTPATIENT
Start: 2025-06-01 | End: 2025-06-02 | Stop reason: HOSPADM

## 2025-06-01 RX ORDER — ACETAMINOPHEN 325 MG/1
650 TABLET ORAL EVERY 4 HOURS PRN
Status: DISCONTINUED | OUTPATIENT
Start: 2025-06-01 | End: 2025-06-02 | Stop reason: HOSPADM

## 2025-06-01 RX ORDER — KETOROLAC TROMETHAMINE 30 MG/ML
30 INJECTION, SOLUTION INTRAMUSCULAR; INTRAVENOUS
Status: COMPLETED | OUTPATIENT
Start: 2025-06-01 | End: 2025-06-01

## 2025-06-01 RX ORDER — SODIUM CHLORIDE 0.9 % (FLUSH) 0.9 %
10 SYRINGE (ML) INJECTION EVERY 12 HOURS PRN
Status: DISCONTINUED | OUTPATIENT
Start: 2025-06-01 | End: 2025-06-02 | Stop reason: HOSPADM

## 2025-06-01 RX ORDER — LEVOTHYROXINE SODIUM 25 UG/1
25 TABLET ORAL
COMMUNITY
Start: 2025-05-28

## 2025-06-01 RX ORDER — IPRATROPIUM BROMIDE 42 UG/1
SPRAY, METERED NASAL
COMMUNITY

## 2025-06-01 RX ORDER — PROCHLORPERAZINE EDISYLATE 5 MG/ML
5 INJECTION INTRAMUSCULAR; INTRAVENOUS EVERY 6 HOURS PRN
Status: DISCONTINUED | OUTPATIENT
Start: 2025-06-01 | End: 2025-06-02 | Stop reason: HOSPADM

## 2025-06-01 RX ORDER — AMLODIPINE BESYLATE 10 MG/1
10 TABLET ORAL DAILY
Status: DISCONTINUED | OUTPATIENT
Start: 2025-06-02 | End: 2025-06-02 | Stop reason: HOSPADM

## 2025-06-01 RX ORDER — LEVOTHYROXINE SODIUM 25 UG/1
25 TABLET ORAL
Status: DISCONTINUED | OUTPATIENT
Start: 2025-06-02 | End: 2025-06-02 | Stop reason: HOSPADM

## 2025-06-01 RX ORDER — POLYETHYLENE GLYCOL 3350 17 G/17G
17 POWDER, FOR SOLUTION ORAL 2 TIMES DAILY PRN
Status: DISCONTINUED | OUTPATIENT
Start: 2025-06-01 | End: 2025-06-02 | Stop reason: HOSPADM

## 2025-06-01 RX ORDER — MAGNESIUM SULFATE 1 G/100ML
1 INJECTION INTRAVENOUS ONCE
Status: COMPLETED | OUTPATIENT
Start: 2025-06-01 | End: 2025-06-01

## 2025-06-01 RX ORDER — GADOBUTROL 604.72 MG/ML
10 INJECTION INTRAVENOUS
Status: COMPLETED | OUTPATIENT
Start: 2025-06-01 | End: 2025-06-01

## 2025-06-01 RX ADMIN — MAGNESIUM SULFATE IN DEXTROSE 1 G: 10 INJECTION, SOLUTION INTRAVENOUS at 01:06

## 2025-06-01 RX ADMIN — KETOROLAC TROMETHAMINE 30 MG: 30 INJECTION, SOLUTION INTRAMUSCULAR at 12:06

## 2025-06-01 RX ADMIN — MAGNESIUM SULFATE HEPTAHYDRATE 2 G: 40 INJECTION, SOLUTION INTRAVENOUS at 02:06

## 2025-06-01 RX ADMIN — CARVEDILOL 12.5 MG: 12.5 TABLET, FILM COATED ORAL at 08:06

## 2025-06-01 RX ADMIN — GADOBUTROL 6 ML: 604.72 INJECTION INTRAVENOUS at 05:06

## 2025-06-01 RX ADMIN — ORPHENADRINE CITRATE 60 MG: 60 INJECTION INTRAMUSCULAR; INTRAVENOUS at 12:06

## 2025-06-01 RX ADMIN — Medication 6 MG: at 08:06

## 2025-06-01 RX ADMIN — MAGNESIUM SULFATE HEPTAHYDRATE 2 G: 40 INJECTION, SOLUTION INTRAVENOUS at 07:06

## 2025-06-01 RX ADMIN — ACETAMINOPHEN 650 MG: 325 TABLET ORAL at 08:06

## 2025-06-01 RX ADMIN — POTASSIUM CHLORIDE 40 MEQ: 1500 TABLET, EXTENDED RELEASE ORAL at 12:06

## 2025-06-01 RX ADMIN — CEFTRIAXONE 1 G: 1 INJECTION, POWDER, FOR SOLUTION INTRAMUSCULAR; INTRAVENOUS at 02:06

## 2025-06-02 VITALS
BODY MASS INDEX: 26.31 KG/M2 | SYSTOLIC BLOOD PRESSURE: 121 MMHG | HEIGHT: 63 IN | WEIGHT: 148.5 LBS | RESPIRATION RATE: 16 BRPM | TEMPERATURE: 98 F | OXYGEN SATURATION: 97 % | DIASTOLIC BLOOD PRESSURE: 69 MMHG | HEART RATE: 79 BPM

## 2025-06-02 LAB
ABSOLUTE EOSINOPHIL (OHS): 0.08 K/UL
ABSOLUTE MONOCYTE (OHS): 0.44 K/UL (ref 0.3–1)
ABSOLUTE NEUTROPHIL COUNT (OHS): 4.1 K/UL (ref 1.8–7.7)
ALBUMIN SERPL BCP-MCNC: 3.1 G/DL (ref 3.5–5.2)
ALP SERPL-CCNC: 56 UNIT/L (ref 40–150)
ALT SERPL W/O P-5'-P-CCNC: 33 UNIT/L (ref 10–44)
ANION GAP (OHS): 12 MMOL/L (ref 8–16)
ANION GAP (OHS): 15 MMOL/L (ref 8–16)
AST SERPL-CCNC: 29 UNIT/L (ref 11–45)
BACTERIA UR CULT: NORMAL
BASOPHILS # BLD AUTO: 0.05 K/UL
BASOPHILS NFR BLD AUTO: 0.9 %
BILIRUB SERPL-MCNC: 0.3 MG/DL (ref 0.1–1)
BUN SERPL-MCNC: 12 MG/DL (ref 6–20)
BUN SERPL-MCNC: 15 MG/DL (ref 6–20)
CALCIUM SERPL-MCNC: 7.7 MG/DL (ref 8.7–10.5)
CALCIUM SERPL-MCNC: 8.1 MG/DL (ref 8.7–10.5)
CHLORIDE SERPL-SCNC: 101 MMOL/L (ref 95–110)
CHLORIDE SERPL-SCNC: 102 MMOL/L (ref 95–110)
CO2 SERPL-SCNC: 23 MMOL/L (ref 23–29)
CO2 SERPL-SCNC: 25 MMOL/L (ref 23–29)
CREAT SERPL-MCNC: 0.7 MG/DL (ref 0.5–1.4)
CREAT SERPL-MCNC: 0.7 MG/DL (ref 0.5–1.4)
ERYTHROCYTE [DISTWIDTH] IN BLOOD BY AUTOMATED COUNT: 11.9 % (ref 11.5–14.5)
GFR SERPLBLD CREATININE-BSD FMLA CKD-EPI: >60 ML/MIN/1.73/M2
GFR SERPLBLD CREATININE-BSD FMLA CKD-EPI: >60 ML/MIN/1.73/M2
GLUCOSE SERPL-MCNC: 106 MG/DL (ref 70–110)
GLUCOSE SERPL-MCNC: 111 MG/DL (ref 70–110)
HCT VFR BLD AUTO: 32 % (ref 37–48.5)
HGB BLD-MCNC: 10.8 GM/DL (ref 12–16)
IMM GRANULOCYTES # BLD AUTO: 0.01 K/UL (ref 0–0.04)
IMM GRANULOCYTES NFR BLD AUTO: 0.2 % (ref 0–0.5)
LYMPHOCYTES # BLD AUTO: 0.7 K/UL (ref 1–4.8)
MAGNESIUM SERPL-MCNC: 2 MG/DL (ref 1.6–2.6)
MCH RBC QN AUTO: 30.9 PG (ref 27–31)
MCHC RBC AUTO-ENTMCNC: 33.8 G/DL (ref 32–36)
MCV RBC AUTO: 91 FL (ref 82–98)
NUCLEATED RBC (/100WBC) (OHS): 0 /100 WBC
OHS QRS DURATION: 78 MS
OHS QTC CALCULATION: 464 MS
PLATELET # BLD AUTO: 288 K/UL (ref 150–450)
PMV BLD AUTO: 10.1 FL (ref 9.2–12.9)
POTASSIUM SERPL-SCNC: 2.9 MMOL/L (ref 3.5–5.1)
POTASSIUM SERPL-SCNC: 3.7 MMOL/L (ref 3.5–5.1)
PROT SERPL-MCNC: 7 GM/DL (ref 6–8.4)
RBC # BLD AUTO: 3.5 M/UL (ref 4–5.4)
RELATIVE EOSINOPHIL (OHS): 1.5 %
RELATIVE LYMPHOCYTE (OHS): 13 % (ref 18–48)
RELATIVE MONOCYTE (OHS): 8.2 % (ref 4–15)
RELATIVE NEUTROPHIL (OHS): 76.2 % (ref 38–73)
SODIUM SERPL-SCNC: 138 MMOL/L (ref 136–145)
SODIUM SERPL-SCNC: 140 MMOL/L (ref 136–145)
WBC # BLD AUTO: 5.38 K/UL (ref 3.9–12.7)

## 2025-06-02 PROCEDURE — 25000003 PHARM REV CODE 250: Performed by: NURSE PRACTITIONER

## 2025-06-02 PROCEDURE — 25000003 PHARM REV CODE 250: Performed by: INTERNAL MEDICINE

## 2025-06-02 PROCEDURE — 80053 COMPREHEN METABOLIC PANEL: CPT | Performed by: NURSE PRACTITIONER

## 2025-06-02 PROCEDURE — 36415 COLL VENOUS BLD VENIPUNCTURE: CPT | Performed by: INTERNAL MEDICINE

## 2025-06-02 PROCEDURE — 83735 ASSAY OF MAGNESIUM: CPT | Performed by: NURSE PRACTITIONER

## 2025-06-02 PROCEDURE — 85025 COMPLETE CBC W/AUTO DIFF WBC: CPT | Performed by: NURSE PRACTITIONER

## 2025-06-02 PROCEDURE — 63600175 PHARM REV CODE 636 W HCPCS: Performed by: STUDENT IN AN ORGANIZED HEALTH CARE EDUCATION/TRAINING PROGRAM

## 2025-06-02 PROCEDURE — 36415 COLL VENOUS BLD VENIPUNCTURE: CPT | Performed by: NURSE PRACTITIONER

## 2025-06-02 RX ORDER — POTASSIUM CHLORIDE 20 MEQ/1
40 TABLET, EXTENDED RELEASE ORAL 3 TIMES DAILY
Status: DISCONTINUED | OUTPATIENT
Start: 2025-06-02 | End: 2025-06-02

## 2025-06-02 RX ORDER — POTASSIUM CHLORIDE 20 MEQ/1
20 TABLET, EXTENDED RELEASE ORAL DAILY
Qty: 7 TABLET | Refills: 0 | Status: SHIPPED | OUTPATIENT
Start: 2025-06-02 | End: 2025-06-09

## 2025-06-02 RX ORDER — POTASSIUM CHLORIDE 20 MEQ/1
40 TABLET, EXTENDED RELEASE ORAL ONCE
Status: COMPLETED | OUTPATIENT
Start: 2025-06-02 | End: 2025-06-02

## 2025-06-02 RX ORDER — CEFDINIR 300 MG/1
300 CAPSULE ORAL 2 TIMES DAILY
Qty: 14 CAPSULE | Refills: 0 | Status: SHIPPED | OUTPATIENT
Start: 2025-06-02 | End: 2025-06-09

## 2025-06-02 RX ADMIN — POTASSIUM CHLORIDE 40 MEQ: 1500 TABLET, EXTENDED RELEASE ORAL at 08:06

## 2025-06-02 RX ADMIN — PAROXETINE HYDROCHLORIDE 20 MG: 20 TABLET, FILM COATED ORAL at 08:06

## 2025-06-02 RX ADMIN — PANTOPRAZOLE SODIUM 40 MG: 40 TABLET, DELAYED RELEASE ORAL at 08:06

## 2025-06-02 RX ADMIN — ATORVASTATIN CALCIUM 20 MG: 10 TABLET, FILM COATED ORAL at 08:06

## 2025-06-02 RX ADMIN — CARVEDILOL 12.5 MG: 12.5 TABLET, FILM COATED ORAL at 08:06

## 2025-06-02 RX ADMIN — AMLODIPINE BESYLATE 10 MG: 10 TABLET ORAL at 08:06

## 2025-06-02 RX ADMIN — LEVOTHYROXINE SODIUM 25 MCG: 0.03 TABLET ORAL at 05:06

## 2025-06-02 RX ADMIN — ACETAMINOPHEN 650 MG: 325 TABLET ORAL at 05:06

## 2025-06-02 RX ADMIN — POTASSIUM CHLORIDE 40 MEQ: 1500 TABLET, EXTENDED RELEASE ORAL at 11:06

## 2025-06-02 RX ADMIN — CEFTRIAXONE 1 G: 1 INJECTION, POWDER, FOR SOLUTION INTRAMUSCULAR; INTRAVENOUS at 08:06

## 2025-06-07 LAB
BACTERIA BLD CULT: NORMAL
BACTERIA BLD CULT: NORMAL